# Patient Record
Sex: MALE | Race: WHITE
[De-identification: names, ages, dates, MRNs, and addresses within clinical notes are randomized per-mention and may not be internally consistent; named-entity substitution may affect disease eponyms.]

---

## 2018-02-26 ENCOUNTER — HOSPITAL ENCOUNTER (OUTPATIENT)
Dept: HOSPITAL 75 - LAB | Age: 83
End: 2018-02-26
Attending: INTERNAL MEDICINE
Payer: MEDICARE

## 2018-02-26 DIAGNOSIS — E78.4: ICD-10-CM

## 2018-02-26 DIAGNOSIS — I25.10: Primary | ICD-10-CM

## 2018-02-26 LAB
ALBUMIN SERPL-MCNC: 4.5 GM/DL (ref 3.2–4.5)
ALP SERPL-CCNC: 79 U/L (ref 40–136)
ALT SERPL-CCNC: 30 U/L (ref 0–55)
BILIRUB SERPL-MCNC: 0.9 MG/DL (ref 0.1–1)
BUN/CREAT SERPL: 26
CALCIUM SERPL-MCNC: 10.1 MG/DL (ref 8.5–10.1)
CHLORIDE SERPL-SCNC: 103 MMOL/L (ref 98–107)
CHOLEST SERPL-MCNC: 121 MG/DL (ref ?–200)
CO2 SERPL-SCNC: 27 MMOL/L (ref 21–32)
CREAT SERPL-MCNC: 1.19 MG/DL (ref 0.6–1.3)
GFR SERPLBLD BASED ON 1.73 SQ M-ARVRAT: 58 ML/MIN
GLUCOSE SERPL-MCNC: 102 MG/DL (ref 70–105)
HDLC SERPL-MCNC: 32 MG/DL (ref 40–60)
POTASSIUM SERPL-SCNC: 4.3 MMOL/L (ref 3.6–5)
PROT SERPL-MCNC: 8.2 GM/DL (ref 6.4–8.2)
SODIUM SERPL-SCNC: 141 MMOL/L (ref 135–145)
TRIGL SERPL-MCNC: 269 MG/DL (ref ?–150)
VLDLC SERPL CALC-MCNC: 54 MG/DL (ref 5–40)

## 2018-02-26 PROCEDURE — 36415 COLL VENOUS BLD VENIPUNCTURE: CPT

## 2018-02-26 PROCEDURE — 80061 LIPID PANEL: CPT

## 2018-02-26 PROCEDURE — 80053 COMPREHEN METABOLIC PANEL: CPT

## 2019-04-03 ENCOUNTER — HOSPITAL ENCOUNTER (OUTPATIENT)
Dept: HOSPITAL 75 - CARD | Age: 84
End: 2019-04-03
Attending: INTERNAL MEDICINE
Payer: MEDICARE

## 2019-04-03 DIAGNOSIS — I25.5: ICD-10-CM

## 2019-04-03 DIAGNOSIS — I49.3: Primary | ICD-10-CM

## 2019-04-03 DIAGNOSIS — E78.5: ICD-10-CM

## 2019-04-03 DIAGNOSIS — I25.10: ICD-10-CM

## 2019-04-03 DIAGNOSIS — I44.1: ICD-10-CM

## 2019-04-03 DIAGNOSIS — I11.0: ICD-10-CM

## 2019-04-03 DIAGNOSIS — I50.22: ICD-10-CM

## 2019-04-03 PROCEDURE — 93226 XTRNL ECG REC<48 HR SCAN A/R: CPT

## 2019-04-03 PROCEDURE — 93225 XTRNL ECG REC<48 HRS REC: CPT

## 2019-04-09 ENCOUNTER — HOSPITAL ENCOUNTER (OUTPATIENT)
Dept: HOSPITAL 75 - LAB | Age: 84
End: 2019-04-09
Attending: INTERNAL MEDICINE
Payer: MEDICARE

## 2019-04-09 DIAGNOSIS — I11.0: ICD-10-CM

## 2019-04-09 DIAGNOSIS — E78.5: ICD-10-CM

## 2019-04-09 DIAGNOSIS — I44.1: Primary | ICD-10-CM

## 2019-04-09 DIAGNOSIS — I25.5: ICD-10-CM

## 2019-04-09 DIAGNOSIS — I49.3: ICD-10-CM

## 2019-04-09 DIAGNOSIS — I50.22: ICD-10-CM

## 2019-04-09 DIAGNOSIS — I25.10: ICD-10-CM

## 2019-04-09 LAB
ALBUMIN SERPL-MCNC: 4.3 GM/DL (ref 3.2–4.5)
ALP SERPL-CCNC: 85 U/L (ref 40–136)
ALT SERPL-CCNC: 20 U/L (ref 0–55)
BASOPHILS # BLD AUTO: 0 10^3/UL (ref 0–0.1)
BASOPHILS NFR BLD AUTO: 1 % (ref 0–10)
BILIRUB SERPL-MCNC: 0.7 MG/DL (ref 0.1–1)
BUN/CREAT SERPL: 28
CALCIUM SERPL-MCNC: 9.8 MG/DL (ref 8.5–10.1)
CHLORIDE SERPL-SCNC: 108 MMOL/L (ref 98–107)
CHOLEST SERPL-MCNC: 93 MG/DL (ref ?–200)
CO2 SERPL-SCNC: 23 MMOL/L (ref 21–32)
CREAT SERPL-MCNC: 1.14 MG/DL (ref 0.6–1.3)
EOSINOPHIL # BLD AUTO: 0.2 10^3/UL (ref 0–0.3)
EOSINOPHIL NFR BLD AUTO: 4 % (ref 0–10)
ERYTHROCYTE [DISTWIDTH] IN BLOOD BY AUTOMATED COUNT: 13.4 % (ref 10–14.5)
GFR SERPLBLD BASED ON 1.73 SQ M-ARVRAT: > 60 ML/MIN
GLUCOSE SERPL-MCNC: 93 MG/DL (ref 70–105)
HCT VFR BLD CALC: 45 % (ref 40–54)
HDLC SERPL-MCNC: 30 MG/DL (ref 40–60)
HGB BLD-MCNC: 15.1 G/DL (ref 13.3–17.7)
LYMPHOCYTES # BLD AUTO: 1.2 X 10^3 (ref 1–4)
LYMPHOCYTES NFR BLD AUTO: 19 % (ref 12–44)
MAGNESIUM SERPL-MCNC: 2 MG/DL (ref 1.8–2.4)
MANUAL DIFFERENTIAL PERFORMED BLD QL: NO
MCH RBC QN AUTO: 32 PG (ref 25–34)
MCHC RBC AUTO-ENTMCNC: 33 G/DL (ref 32–36)
MCV RBC AUTO: 96 FL (ref 80–99)
MONOCYTES # BLD AUTO: 0.9 X 10^3 (ref 0–1)
MONOCYTES NFR BLD AUTO: 14 % (ref 0–12)
NEUTROPHILS # BLD AUTO: 4 X 10^3 (ref 1.8–7.8)
NEUTROPHILS NFR BLD AUTO: 64 % (ref 42–75)
PLATELET # BLD: 148 10^3/UL (ref 130–400)
PMV BLD AUTO: 12 FL (ref 7.4–10.4)
POTASSIUM SERPL-SCNC: 4.3 MMOL/L (ref 3.6–5)
PROT SERPL-MCNC: 7.4 GM/DL (ref 6.4–8.2)
SODIUM SERPL-SCNC: 140 MMOL/L (ref 135–145)
TRIGL SERPL-MCNC: 95 MG/DL (ref ?–150)
VLDLC SERPL CALC-MCNC: 19 MG/DL (ref 5–40)
WBC # BLD AUTO: 6.3 10^3/UL (ref 4.3–11)

## 2019-04-09 PROCEDURE — 80061 LIPID PANEL: CPT

## 2019-04-09 PROCEDURE — 36415 COLL VENOUS BLD VENIPUNCTURE: CPT

## 2019-04-09 PROCEDURE — 83735 ASSAY OF MAGNESIUM: CPT

## 2019-04-09 PROCEDURE — 85025 COMPLETE CBC W/AUTO DIFF WBC: CPT

## 2019-04-09 PROCEDURE — 80053 COMPREHEN METABOLIC PANEL: CPT

## 2019-04-09 PROCEDURE — 84443 ASSAY THYROID STIM HORMONE: CPT

## 2021-12-18 ENCOUNTER — HOSPITAL ENCOUNTER (INPATIENT)
Dept: HOSPITAL 75 - ER | Age: 86
LOS: 3 days | Discharge: HOME | DRG: 242 | End: 2021-12-21
Attending: INTERNAL MEDICINE | Admitting: FAMILY MEDICINE
Payer: MEDICARE

## 2021-12-18 VITALS — BODY MASS INDEX: 27.04 KG/M2 | WEIGHT: 186.73 LBS | HEIGHT: 69.69 IN

## 2021-12-18 VITALS — DIASTOLIC BLOOD PRESSURE: 83 MMHG | SYSTOLIC BLOOD PRESSURE: 144 MMHG

## 2021-12-18 DIAGNOSIS — N18.9: ICD-10-CM

## 2021-12-18 DIAGNOSIS — R35.0: ICD-10-CM

## 2021-12-18 DIAGNOSIS — I44.7: ICD-10-CM

## 2021-12-18 DIAGNOSIS — Z95.5: ICD-10-CM

## 2021-12-18 DIAGNOSIS — I44.2: Primary | ICD-10-CM

## 2021-12-18 DIAGNOSIS — Z66: ICD-10-CM

## 2021-12-18 DIAGNOSIS — E78.5: ICD-10-CM

## 2021-12-18 DIAGNOSIS — I25.10: ICD-10-CM

## 2021-12-18 DIAGNOSIS — I25.5: ICD-10-CM

## 2021-12-18 DIAGNOSIS — B37.49: ICD-10-CM

## 2021-12-18 DIAGNOSIS — K40.90: ICD-10-CM

## 2021-12-18 DIAGNOSIS — Z88.0: ICD-10-CM

## 2021-12-18 DIAGNOSIS — I50.33: ICD-10-CM

## 2021-12-18 DIAGNOSIS — I65.23: ICD-10-CM

## 2021-12-18 DIAGNOSIS — I13.0: ICD-10-CM

## 2021-12-18 DIAGNOSIS — Z87.891: ICD-10-CM

## 2021-12-18 DIAGNOSIS — I44.1: ICD-10-CM

## 2021-12-18 DIAGNOSIS — Z88.2: ICD-10-CM

## 2021-12-18 DIAGNOSIS — I49.3: ICD-10-CM

## 2021-12-18 DIAGNOSIS — H10.9: ICD-10-CM

## 2021-12-18 LAB
ALBUMIN SERPL-MCNC: 3.5 GM/DL (ref 3.2–4.5)
ALP SERPL-CCNC: 143 U/L (ref 40–136)
ALT SERPL-CCNC: 61 U/L (ref 0–55)
APTT BLD: 30 SEC (ref 24–35)
BASOPHILS # BLD AUTO: 0.1 10^3/UL (ref 0–0.1)
BASOPHILS NFR BLD AUTO: 1 % (ref 0–10)
BILIRUB SERPL-MCNC: 1.3 MG/DL (ref 0.1–1)
BUN/CREAT SERPL: 33
CALCIUM SERPL-MCNC: 9.1 MG/DL (ref 8.5–10.1)
CHLORIDE SERPL-SCNC: 109 MMOL/L (ref 98–107)
CO2 SERPL-SCNC: 18 MMOL/L (ref 21–32)
CREAT SERPL-MCNC: 1.12 MG/DL (ref 0.6–1.3)
EOSINOPHIL # BLD AUTO: 0.2 10^3/UL (ref 0–0.3)
EOSINOPHIL NFR BLD AUTO: 3 % (ref 0–10)
EOSINOPHIL NFR BLD MANUAL: 3 %
GFR SERPLBLD BASED ON 1.73 SQ M-ARVRAT: 62 ML/MIN
GLUCOSE SERPL-MCNC: 100 MG/DL (ref 70–105)
HCT VFR BLD CALC: 52 % (ref 40–54)
HGB BLD-MCNC: 17.2 G/DL (ref 13.3–17.7)
INR PPP: 1.1 (ref 0.8–1.4)
LYMPHOCYTES # BLD AUTO: 0.4 10^3/UL (ref 1–4)
LYMPHOCYTES NFR BLD AUTO: 4 % (ref 12–44)
MAGNESIUM SERPL-MCNC: 1.7 MG/DL (ref 1.6–2.4)
MANUAL DIFFERENTIAL PERFORMED BLD QL: YES
MCH RBC QN AUTO: 33 PG (ref 25–34)
MCHC RBC AUTO-ENTMCNC: 33 G/DL (ref 32–36)
MCV RBC AUTO: 100 FL (ref 80–99)
MONOCYTES # BLD AUTO: 0.9 10^3/UL (ref 0–1)
MONOCYTES NFR BLD AUTO: 11 % (ref 0–12)
MONOCYTES NFR BLD: 9 %
NEUTROPHILS # BLD AUTO: 6.9 10^3/UL (ref 1.8–7.8)
NEUTROPHILS NFR BLD AUTO: 81 % (ref 42–75)
NEUTS BAND NFR BLD MANUAL: 83 %
PLATELET # BLD: 118 10^3/UL (ref 130–400)
PMV BLD AUTO: 13.5 FL (ref 9–12.2)
POTASSIUM SERPL-SCNC: 4.8 MMOL/L (ref 3.6–5)
PROT SERPL-MCNC: 7 GM/DL (ref 6.4–8.2)
PROTHROMBIN TIME: 14.9 SEC (ref 12.2–14.7)
RBC MORPH BLD: NORMAL
SODIUM SERPL-SCNC: 137 MMOL/L (ref 135–145)
VARIANT LYMPHS NFR BLD MANUAL: 5 %
WBC # BLD AUTO: 8.5 10^3/UL (ref 4.3–11)

## 2021-12-18 PROCEDURE — 36415 COLL VENOUS BLD VENIPUNCTURE: CPT

## 2021-12-18 PROCEDURE — 85027 COMPLETE CBC AUTOMATED: CPT

## 2021-12-18 PROCEDURE — 80048 BASIC METABOLIC PNL TOTAL CA: CPT

## 2021-12-18 PROCEDURE — 71046 X-RAY EXAM CHEST 2 VIEWS: CPT

## 2021-12-18 PROCEDURE — 85025 COMPLETE CBC W/AUTO DIFF WBC: CPT

## 2021-12-18 PROCEDURE — 87070 CULTURE OTHR SPECIMN AEROBIC: CPT

## 2021-12-18 PROCEDURE — 93005 ELECTROCARDIOGRAM TRACING: CPT

## 2021-12-18 PROCEDURE — 51702 INSERT TEMP BLADDER CATH: CPT

## 2021-12-18 PROCEDURE — 71045 X-RAY EXAM CHEST 1 VIEW: CPT

## 2021-12-18 PROCEDURE — 80076 HEPATIC FUNCTION PANEL: CPT

## 2021-12-18 PROCEDURE — 85007 BL SMEAR W/DIFF WBC COUNT: CPT

## 2021-12-18 PROCEDURE — 80053 COMPREHEN METABOLIC PANEL: CPT

## 2021-12-18 PROCEDURE — 82947 ASSAY GLUCOSE BLOOD QUANT: CPT

## 2021-12-18 PROCEDURE — 85610 PROTHROMBIN TIME: CPT

## 2021-12-18 PROCEDURE — 83874 ASSAY OF MYOGLOBIN: CPT

## 2021-12-18 PROCEDURE — 33208 INSRT HEART PM ATRIAL & VENT: CPT

## 2021-12-18 PROCEDURE — 83735 ASSAY OF MAGNESIUM: CPT

## 2021-12-18 PROCEDURE — 80061 LIPID PANEL: CPT

## 2021-12-18 PROCEDURE — 85730 THROMBOPLASTIN TIME PARTIAL: CPT

## 2021-12-18 PROCEDURE — 84484 ASSAY OF TROPONIN QUANT: CPT

## 2021-12-18 PROCEDURE — 87081 CULTURE SCREEN ONLY: CPT

## 2021-12-18 PROCEDURE — 83880 ASSAY OF NATRIURETIC PEPTIDE: CPT

## 2021-12-18 PROCEDURE — 93041 RHYTHM ECG TRACING: CPT

## 2021-12-18 RX ADMIN — Medication SCH ML: at 21:28

## 2021-12-18 RX ADMIN — FUROSEMIDE SCH MG: 10 INJECTION, SOLUTION INTRAVENOUS at 21:27

## 2021-12-18 NOTE — ED CARDIAC GENERAL
History of Present Illness


General


Chief Complaint:  Cardiac/General Problems


Stated Complaint:  LEGS SWELLING


Source:  patient


Exam Limitations:  no limitations


 (ALEENA KAPLAN)





History of Present Illness


Date Seen by Provider:  Dec 18, 2021


Time Seen by Provider:  15:00


Initial Comments


To ER from [facility] with reports of bradycardia.  He presented there by 

private vehicle accompanied by his wife with reports of bilateral lower 

extremity swelling,, scrotal swelling, shortness of breath.  No chest pain.  

Follows with Dr. Diallo, history of coronary artery disease with 2 coronary 

stents.  He informs me that he does not want to be resuscitated or placed on a 

ventilator if his heart should stop.  He is okay with having a pacemaker placed.


Timing/Duration:  1-2 days


Severity:  moderate


Activities at Onset:  none


Prior CP/Workup:  no prior chest pain


NTG SL PTA:  No


ASA po PTA:  No


Associated Systoms:  No Chest Pain, No Cough; Shortness of Air (ALEENA KAPLAN)





Allergies and Home Medications


Allergies


Coded Allergies:  


     Penicillins (Unverified  Allergy, Mild, 09)


     Sulfa (Sulfonamide Antibiotics) (Verified  Allergy, Unknown, 09)





Patient Home Medication List


Home Medication List Reviewed:  Yes


 (ALEENA KAPLAN)


Lisinopril (Zestril) 20 Mg Tablet, (Reported)


   Entered as Reported by: EL ASHFORD on 09


Metoclopramide Hcl (Metoclopramide Hcl) 10 Mg Tablet, (Reported)


   Entered as Reported by: EL ASHFORD on 09


Metoprolol Succinate (Toprol Xl) 50 Mg Tab, (Reported)


   Entered as Reported by: EL ASHFORD on 09


[Htn Med]  , (Reported)


   Entered as Reported by: KEN GARCIA on 09





Review of Systems


Review of Systems


Constitutional:  see HPI


EENTM:  No Symptoms Reported


Respiratory:  See HPI, Orthopnea


Cardiovascular:  See HPI, Chest Pain


Gastrointestinal:  No Symptoms Reported


Genitourinary:  No Symptoms Reported


Musculoskeletal:  no symptoms reported


Skin:  no symptoms reported


Psychiatric/Neurological:  No Symptoms Reported


Endocrine:  No Symptoms Reported


Hematologic/Lymphatic:  No Symptoms Reported (ALEENA KAPLAN)





Past Medical-Social-Family Hx


Past Medical History


Reproductive Disorders:  No


 (ALEENA KAPLAN)





Physical Exam


Vital Signs


Capillary Refill :  


 (ALEENA KAPLAN)


Height, Weight, BMI


Height: '"


Weight: lbs. oz. kg;  BMI


Method:


General Appearance:  No Apparent Distress, WD/WN, Other (Alert mentating well 

blood pressure is 180/120.  Heart rate is 30 for complete heart block.  Oxygen 

99% respiratory rate 24.  Defibrillator patches attached to them upon arrival to

ER, Lasix Lovenox and aspirin given.  Dr. Diallo notified.)


Respiratory:  No Accessory Muscle Use, No Respiratory Distress


Cardiovascular:  Bradycardia, Irregularly Irregular


Gastrointestinal:  Normal Bowel Sounds, Non Tender, Soft


Extremity:  No Pedal Edema (3+ pitting edema up to the knees bilaterally.), 

Other (Cyanotic extremities and ears)


Neurologic/Psychiatric:  Alert


Skin:  Normal Color, Warm/Dry (ALEENA KAPLAN)


Extremity:  Pedal Edema (3+ bilateral lower extremity)


Neurologic/Psychiatric:  Oriented x3, Other (Hard of hearing) (MAIDA HAIR MD)





Progress/Results/Core Measures


Results/Orders


Lab Results





Laboratory Tests








Test


 21


14:54 Range/Units


 





 (MAIDA HAIR MD)


My Orders





Orders - MAIDA HAIR MD


Furosemide  Injection (Lasix  Injection) (21 14:57)


 (MAIDA HAIR MD)


Medications Given in ED





Current Medications








 Medications  Dose


 Ordered  Sig/Mickie


 Route  Start Time


 Stop Time Status Last Admin


Dose Admin


 


 Aspirin  324 mg  ONCE  ONCE


 PO  21 14:45


 21 14:46 DC 21 14:59


324 MG





 (MAIDA HAIR MD)





Progress


Progress Note :  


Progress Note


1506: I did see the patient with YULIA Huffman and was at bedside for 

evaluation.  Patient does show clear third-degree heart block on EKG and 

monitor.  Blood pressure is appropriate though and actually he is slightly 

hypertensive.  He does have marked edema to both legs.  Denies chest pain 

currently.  I did discuss the case with Dr vEans and reviewed EKG and current 

findings.  He is recommending  mg p.o., Lasix 40 mg IV, Lovenox 40 mg 

subcu and he will be on consult.  Recommending admission with pacemaker place

ment to follow likely tomorrow.  He is okay with cardiac stepdown bed.  Patient 

previously was under the service of Dr. Schmidt but now does not have primary 

physician and usually just sees Dr Evans.  He will be admitted to the 

hospitalist service.  Pending labs for review and we will ensure appropriate 

electrolyte balance.  Admit, inpatient status.  Patient and family updated by me

and agree with the plan.  We did discuss CODE STATUS with the patient and wife 

and he request DNR status.


 (MAIDA HAIR MD)


Initial ECG Impression Date:  Dec 18, 2021


Initial ECG Impression Time:  14:42


Initial ECG Rate:  35


Comment


Third-degree heart block with junctional escape rhythm and interventricular 

conduction delay.  Mild ST elevation in leads II, III and aVF it would appear.  

Left axis deviation noted.  I did discuss the case with Dr. Evans and reviewed 

EKG with him.  He does not believe this is ST elevation MI and represents more 

escape rhythm with interventricular conduction delay for which I agree.


 (MAIDA HAIR MD)





Departure


Communication (Admissions)


NAME:   LIZZIE CURRY


[facility] REC#:   [id number]


ACCOUNT#:   [id number]


PT STATUS:   REG ER


:   1935


PHYSICIAN:   ALEENA KAPLAN


ADMIT DATE:   21/ER


                                   ***Draft***


Date of Exam:21





CHEST 1 VIEW, AP/PA ONLY








Indication: Chest pain.





Comparison: None.





Discussion: Single portable upright view of the chest was


obtained. The fibular patches are present. Cardiomegaly is noted.


There is likely a small right pleural effusion. No consolidation.


No pneumothorax or osseous abnormality.





Impression:


1. Cardiomegaly with small right pleural effusion.





  Dictated on workstation # [id number]








Dict:   21 1522


Trans:   21 1526


[facility] [id number]





Interpreted by:     MIKHAIL JAMES MD


Electronically signed by:  


 (ALEENA KAPLAN)


Time/Spoke to Consulting Phy:  14:58


 (MAIDA HAIR MD)





Impression





   Primary Impression:  


   Complete heart block


   Additional Impression:  


   CHF (congestive heart failure)


Disposition:   ADMITTED AS INPATIENT


Condition:  Stable





Admissions


Decision to Admit Reason:  Admit from ER (General)


Decision to Admit/Date:  Dec 18, 2021


Time/Decision to Admit Time:  15:03


 (ALEENA KAPLAN)





Departure-Patient Inst.


Referrals:  


NO,LOCAL PHYSICIAN (PCP/Family)


Primary Care Physician











ALEENA KAPLAN             Dec 18, 2021 15:03


MAIDA HAIR MD          Dec 18, 2021 15:10

## 2021-12-18 NOTE — DIAGNOSTIC IMAGING REPORT
Indication: Chest pain.



Comparison: None.



Discussion: Single portable upright view of the chest was

obtained. The fibular patches are present. Cardiomegaly is noted.

There is likely a small right pleural effusion. No consolidation.

No pneumothorax or osseous abnormality.



Impression:

1. Cardiomegaly with small right pleural effusion.



Dictated by: 



  Dictated on workstation # [id number]

## 2021-12-19 LAB
ALBUMIN SERPL-MCNC: 3 GM/DL (ref 3.2–4.5)
ALP SERPL-CCNC: 119 U/L (ref 40–136)
ALT SERPL-CCNC: 50 U/L (ref 0–55)
BASOPHILS # BLD AUTO: 0.1 10^3/UL (ref 0–0.1)
BASOPHILS NFR BLD AUTO: 1 % (ref 0–10)
BILIRUB DIRECT SERPL-MCNC: 0.8 MG/DL (ref 0–0.3)
BILIRUB SERPL-MCNC: 1.7 MG/DL (ref 0.1–1)
BUN/CREAT SERPL: 30
CALCIUM SERPL-MCNC: 8.8 MG/DL (ref 8.5–10.1)
CHLORIDE SERPL-SCNC: 105 MMOL/L (ref 98–107)
CHOLEST SERPL-MCNC: 78 MG/DL (ref ?–200)
CO2 SERPL-SCNC: 21 MMOL/L (ref 21–32)
CREAT SERPL-MCNC: 1.16 MG/DL (ref 0.6–1.3)
EOSINOPHIL # BLD AUTO: 0.3 10^3/UL (ref 0–0.3)
EOSINOPHIL NFR BLD AUTO: 3 % (ref 0–10)
GFR SERPLBLD BASED ON 1.73 SQ M-ARVRAT: 60 ML/MIN
GLUCOSE SERPL-MCNC: 90 MG/DL (ref 70–105)
HCT VFR BLD CALC: 49 % (ref 40–54)
HDLC SERPL-MCNC: 35 MG/DL (ref 40–60)
HGB BLD-MCNC: 16.6 G/DL (ref 13.3–17.7)
LYMPHOCYTES # BLD AUTO: 0.4 10^3/UL (ref 1–4)
LYMPHOCYTES NFR BLD AUTO: 4 % (ref 12–44)
MANUAL DIFFERENTIAL PERFORMED BLD QL: NO
MCH RBC QN AUTO: 33 PG (ref 25–34)
MCHC RBC AUTO-ENTMCNC: 34 G/DL (ref 32–36)
MCV RBC AUTO: 97 FL (ref 80–99)
MONOCYTES # BLD AUTO: 1 10^3/UL (ref 0–1)
MONOCYTES NFR BLD AUTO: 11 % (ref 0–12)
NEUTROPHILS # BLD AUTO: 7.2 10^3/UL (ref 1.8–7.8)
NEUTROPHILS NFR BLD AUTO: 81 % (ref 42–75)
PLATELET # BLD: 99 10^3/UL (ref 130–400)
PMV BLD AUTO: 13 FL (ref 9–12.2)
POTASSIUM SERPL-SCNC: 4.2 MMOL/L (ref 3.6–5)
PROT SERPL-MCNC: 5.9 GM/DL (ref 6.4–8.2)
SODIUM SERPL-SCNC: 139 MMOL/L (ref 135–145)
TRIGL SERPL-MCNC: 69 MG/DL (ref ?–150)
VLDLC SERPL CALC-MCNC: 14 MG/DL (ref 5–40)
WBC # BLD AUTO: 8.9 10^3/UL (ref 4.3–11)

## 2021-12-19 PROCEDURE — 0JH606Z INSERTION OF PACEMAKER, DUAL CHAMBER INTO CHEST SUBCUTANEOUS TISSUE AND FASCIA, OPEN APPROACH: ICD-10-PCS | Performed by: INTERNAL MEDICINE

## 2021-12-19 PROCEDURE — 02HK3JZ INSERTION OF PACEMAKER LEAD INTO RIGHT VENTRICLE, PERCUTANEOUS APPROACH: ICD-10-PCS | Performed by: INTERNAL MEDICINE

## 2021-12-19 PROCEDURE — 02H63JZ INSERTION OF PACEMAKER LEAD INTO RIGHT ATRIUM, PERCUTANEOUS APPROACH: ICD-10-PCS | Performed by: INTERNAL MEDICINE

## 2021-12-19 RX ADMIN — Medication SCH ML: at 15:45

## 2021-12-19 RX ADMIN — Medication SCH ML: at 14:00

## 2021-12-19 RX ADMIN — FUROSEMIDE SCH MG: 10 INJECTION, SOLUTION INTRAVENOUS at 08:06

## 2021-12-19 RX ADMIN — CIPROFLOXACIN SCH MG: 500 TABLET, FILM COATED ORAL at 21:38

## 2021-12-19 RX ADMIN — Medication SCH ML: at 21:39

## 2021-12-19 RX ADMIN — NYSTATIN SCH GM: 100000 OINTMENT TOPICAL at 21:38

## 2021-12-19 NOTE — HISTORY & PHYSICAL
HPI


History of Present Illness:


Having shortness of breath for about a month. His granddaughter works at a 

nursing home and he had her bring him a COVID19 test which was negative. The 

next morning his grandson and daughter took him to the clinic and they were told

to bring him to the hospital right away. He states he feels about the same 

today.





He has not had COVID vaccination, he is interested in it, but wants to talk to 

his wife first.


Source:  patient


Exam Limitations:  clinical condition


Date seen by provider:  Dec 19, 2021


Time Seen by Provider:  09:35


Attending Physician


Sarita Garcia MD


PCP


No,Local Physician


Consult





Date of Admission


Dec 18, 2021 at 15:06





Home Medications


Home Medications


Reviewed patient Home Medication Reconciliation performed by pharmacy medication

reconciliations technician and/or nursing.


Patients Allergies have been reviewed.





Allergies


Coded Allergies:  


     Penicillins (Unverified  Allergy, Mild, 4/12/09)


     Sulfa (Sulfonamide Antibiotics) (Verified  Allergy, Unknown, 4/12/09)





PMH-Social-Family Hx


Patient Social History


Smoking Status:  Former Smoker (quit around 1990)


Alcohol Use?:  No


Have you traveled recently?:  No





Immunizations Up To Date


Influenza Vaccine Up-to-Date:  No; Not Current


First/Initial COVID19 Vaccinat:  None


Second COVID19 Vaccination Erasmo:  None


Third COVID19 Vaccination Date:  None





Past Medical History





PMHx:


Coronary artery disease


Hypertension





SurgHx:


Coronary artery stenting





Review of Systems ([facility])


Constitutional:  No fever


EENTM:  other (runny nose all the time); No throat pain


Respiratory:  cough (off and on for some time), short of breath


Cardiovascular:  No chest pain


Gastrointestinal:  abdominal pain (occasional bellyache); No constipation; 

diarrhea (occasional); No nausea, No vomiting


Genitourinary:  No dysuria


Musculoskeletal:  joint pain (right hand hurting this morning and about a week 

ago); No muscle pain


Skin:  rash (around testicles, non-itching)





Reviewed Test Results


Reviewed Test Results


Lab





Laboratory Tests








Test


 12/18/21


14:54 12/19/21


05:58 Range/Units


 


 


White Blood Count


 8.5 


 8.9 


 4.3-11.0


10^3/uL


 


Red Blood Count


 5.19 


 5.04 


 4.30-5.52


10^6/uL


 


Hemoglobin 17.2  16.6  13.3-17.7  g/dL


 


Hematocrit 52  49  40-54  %


 


Mean Corpuscular Volume 100 H 97  80-99  fL


 


Mean Corpuscular Hemoglobin 33  33  25-34  pg


 


Mean Corpuscular Hemoglobin


Concent 33 


 34 


 32-36  g/dL





 


Red Cell Distribution Width 14.6 H 14.4  10.0-14.5  %


 


Platelet Count


 118 L


 99 L


 130-400


10^3/uL


 


Mean Platelet Volume 13.5 H 13.0 H 9.0-12.2  fL


 


Immature Granulocyte % (Auto) 0  0   %


 


Neutrophils (%) (Auto) 81 H 81 H 42-75  %


 


Lymphocytes (%) (Auto) 4 L 4 L 12-44  %


 


Monocytes (%) (Auto) 11  11  0-12  %


 


Eosinophils (%) (Auto) 3  3  0-10  %


 


Basophils (%) (Auto) 1  1  0-10  %


 


Neutrophils # (Auto)


 6.9 


 7.2 


 1.8-7.8


10^3/uL


 


Lymphocytes # (Auto)


 0.4 L


 0.4 L


 1.0-4.0


10^3/uL


 


Monocytes # (Auto)


 0.9 


 1.0 


 0.0-1.0


10^3/uL


 


Eosinophils # (Auto)


 0.2 


 0.3 


 0.0-0.3


10^3/uL


 


Basophils # (Auto)


 0.1 


 0.1 


 0.0-0.1


10^3/uL


 


Immature Granulocyte # (Auto)


 0.0 


 0.0 


 0.0-0.1


10^3/uL


 


Neutrophils % (Manual) 83    %


 


Lymphocytes % (Manual) 5    %


 


Monocytes % (Manual) 9    %


 


Eosinophils % (Manual) 3    %


 


Percent Immature Platelet


Fraction 8.7 H


 8.1 H


 0.0-7.6  %





 


Blood Morphology Comment NORMAL    


 


Prothrombin Time 14.9 H  12.2-14.7  SEC


 


INR Comment 1.1   0.8-1.4  


 


Activated Partial


Thromboplast Time 30 


 


 24-35  SEC





 


Sodium Level 137  139  135-145  MMOL/L


 


Potassium Level 4.8  4.2  3.6-5.0  MMOL/L


 


Chloride Level 109 H 105    MMOL/L


 


Carbon Dioxide Level 18 L 21  21-32  MMOL/L


 


Anion Gap 10  13  5-14  MMOL/L


 


Blood Urea Nitrogen 37 H 35 H 7-18  MG/DL


 


Creatinine


 1.12 


 1.16 


 0.60-1.30


MG/DL


 


Estimat Glomerular Filtration


Rate 62 


 60 


  





 


BUN/Creatinine Ratio 33  30   


 


Glucose Level 100  90    MG/DL


 


Calcium Level 9.1  8.8  8.5-10.1  MG/DL


 


Corrected Calcium 9.5   8.5-10.1  MG/DL


 


Magnesium Level 1.7   1.6-2.4  MG/DL


 


Total Bilirubin 1.3 H 1.7 H 0.1-1.0  MG/DL


 


Aspartate Amino Transf


(AST/SGOT) 49 H


 45 H


 5-34  U/L





 


Alanine Aminotransferase


(ALT/SGPT) 61 H


 50 


 0-55  U/L





 


Alkaline Phosphatase 143 H 119    U/L


 


Myoglobin


 155.7 H


 


 10.0-92.0


NG/ML


 


Troponin I 0.091 H  <0.028  NG/ML


 


B-Type Natriuretic Peptide 3652.2 H  <100.0  PG/ML


 


Total Protein 7.0  5.9 L 6.4-8.2  GM/DL


 


Albumin 3.5  3.0 L 3.2-4.5  GM/DL


 


Direct Bilirubin  0.8 H 0.0-0.3  MG/DL


 


Indirect Bilirubin  0.9   MG/DL


 


Triglycerides Level  69  <150  MG/DL


 


Cholesterol Level  78  < 200  MG/DL


 


LDL Cholesterol Direct  28  1-129  MG/DL


 


VLDL Cholesterol  14  5-40  MG/DL


 


HDL Cholesterol  35 L 40-60  MG/DL








Radiology


CXR 12/18/21: 


Impression: 1. Cardiomegaly with small right pleural effusion.





Physical Exam-([facility])


Physical Exam


Vital Signs





                          VS - Last 72 Hours, by Label








 12/18/21 12/18/21 12/18/21 12/18/21





 14:31 16:40 16:49 16:51


 


Temp   36.6 


 


Pulse 35 34 35 


 


Resp 25 22 20 


 


B/P (MAP) 160/102 (121) 144/83 139/73 


 


Pulse Ox 94 95 99 


 


O2 Delivery Room Air Room Air Room Air Room Air


 


    





 12/18/21 12/18/21 12/18/21 12/18/21





 16:52 19:00 19:41 21:00


 


Temp   36.8 


 


Pulse 33 40 35 


 


Resp   21 


 


B/P (MAP)   144/79 


 


Pulse Ox   96 


 


O2 Delivery   Room Air Room Air


 


    





 12/19/21 12/19/21 12/19/21 12/19/21





 00:00 01:00 04:00 07:00


 


Temp 37.0  36.5 


 


Pulse 36 36 35 39


 


Resp 14  22 


 


B/P (MAP) 159/62  114/66 


 


Pulse Ox 94  96 


 


O2 Delivery Room Air  Room Air 


 


    





 12/19/21   





 08:10   


 


Pulse 36   


 


Resp 14   


 


B/P (MAP) 180/91   


 


Pulse Ox 96   


 


O2 Delivery Room Air   





Capillary Refill : Less Than 3 Seconds


General Appearance:  no apparent distress


Respiratory:  rales


Cardiovascular:  no murmur, bradycardia


Gastrointestinal:  normal bowel sounds, non tender, soft


Extremities:  pedal edema (2+ pitting edema to above knees)


Neurologic/Psychiatric:  alert, normal mood/affect, oriented x 3


Skin:  other (erythema with extension to thighs over scrotum)





Assessment/Plan


Assessment/Plan


Admission Status:  Inpatient Order (span 2 midnights)


Reason for Inpatient Admission:  


Sevre bradycardia with CHF exacerbation





(1) Complete heart block


Status:  Acute


Assessment & Plan:  Hemodynamically stable so far, plan for pacemaker per 

Cardiology later today.





(2) Coronary artery disease


Status:  Chronic


Qualifiers:  


   Qualified Codes:  I25.10 - Atherosclerotic heart disease of native coronary 

artery without angina pectoris


(3) CHF (congestive heart failure)


Status:  Acute


Assessment & Plan:  Cardiology consulted, appreciate recommendations. Currently 

on lasix 40 mg IV BID.





(4) Intertrigo of genitocrural region due to Candida species


Status:  Acute


Assessment & Plan:  Nystatin ointment





(5) COVID-19 vaccination not done


Status:  Acute


Assessment & Plan:  Discussed availability, he will talk with wife.





(6) DVT prophylaxis


Status:  Acute


Assessment & Plan:  Enoxaparin when okay with Cardiology








Clinical Quality Measures


AMI/AHF:


ASA po Prior to arrival:  SARITA Toussaint MD             Dec 19, 2021 09:32

## 2021-12-19 NOTE — DIAGNOSTIC IMAGING REPORT
INDICATION: Cardiac device placement.



TIME OF EXAM: 12:49 PM



Correlation is made prior chest 12/18/2021.



Heart is enlarged. Cardiac pacer has been placed has lead tips in

region of right atrium and right ventricle. Lungs appear clear.

No infiltrate, effusion or pneumothorax is seen.



IMPRESSION: Pacemaker placement. No pneumothorax is identified.



Dictated by: 



  Dictated on workstation # [id number]

## 2021-12-19 NOTE — OPERATIVE REPORT
DATE OF SERVICE:  12/19/2021



PREOPERATIVE DIAGNOSIS:

Complete heart block.



POSTOPERATIVE DIAGNOSIS:

Complete heart block.



PROCEDURE:

Dual chamber pacemaker implantation.



ESTIMATED BLOOD LOSS:

Less than 20 mL.



INDICATIONS:

The patient is an 86-year-old gentleman who has complete heart block and who has

been symptomatic from it.  New dual chamber pacemaker implantation was carried

out today after having obtained an informed consent.



DESCRIPTION OF PROCEDURE:

He was brought to the cardiac catheterization laboratory in a fasting state. 

The left prepectoral area was prepared and draped in the usual sterile fashion. 

Lidocaine 1% was used for local anesthesia.  Modified Seldinger technique was

used to advance 2 guidewires into the left subclavian vein and the tip of the

guidewires were placed in the right atrium.  Subsequently, we used sharp and

blunt dissection to make a pacemaker pocket.  Good hemostasis was assured.  The

guidewires were used to advance sheaths and the wires were removed.  The sheaths

were used to advance leads and the sheaths were removed.  The leads were

positioned under fluoroscopy.  The ventricular lead was placed at the right

ventricular apex and actively fixed.  The atrial lead was placed at the right

atrial appendage and actively fixed.  R waves were measured at 6.5 millivolts. 

Pacing impedance in the ventricle was 751 ohms.  Capture threshold was 0.8 volts

at 0.4 milliseconds.  P-wave amplitude was 1.9 millivolts.  Atrial lead

impedance was 534 ohms.  Atrial capture threshold was 1.4 volts at 0.4

milliseconds.  The leads were sutured to the prepectoral fascia using 0

Ethibond.  The pacemaker pocket had been packed with saline gauze.  This was

removed.  The pocket was thoroughly irrigated with an antibiotic solution.  Good

hemostasis was assured.  The leads were attached to a dual chamber pacemaker and

the pacemaker and leads were placed in the pocket and the pocket was closed in 2

layers using 3.0 Vicryl.  The right atrial lead is [employer], serial #[id number]. 

The ventricular lead is [employer] with serial #[id number].  The leads were

attached to a dual chamber pacemaker.  This is a [employer] with serial #[id number]. 

The device a DDDR mode with a lower rate of 60 beats per minute and upper rate

of 130 beats per minute.





Job ID: [id number]

DocumentID: [id number]

Dictated Date:  12/19/2021 12:29:43

Transcription Date: 12/19/2021 16:31:02

Dictated By: ROBERTH NIEVES MD, MA, FACP, FACC,

## 2021-12-19 NOTE — CONSULTATION-CARDIOLOGY
HPI-Cardiology


Cardiology Consultation:


Date of Consultation


12/19/21


Time Seen by a Provider:  09:30


Date of Admission





Attending Physician


Gladys Garcia MD


Admitting Physician


No,Local Physician


Consulting Physician


ROBERTH NIEVES MD, MA, FACP, FACC, FSCAI, CCDS





HPI:


Chief Complaint:


Shortness of breath





85 yo man with increasing shortness of breath for several weeks. Has been 

noticing increasing leg swelling and also some scrotal swelling. Denies cp or 

palp or syncope. Notes gen malaise and weakness. Denies fever or chills





Review of Systems-Cardiology


Review of Systems


Constitutional:  As described under HPI


Eyes:  No vision change


Ears/Nose/Throat:  chronic hearing loss; No ear pain, No nasal drainage, No 

recent hearing loss


Respiratory:  As described under HPI


Cardiovascular:  As described under HPI


Gastrointestinal:  No diarrhea, No nausea, No vomiting


Genitourinary:  No hematuria; other (chronic frequency of urination); No urine 

frequency changes


Musculoskeletal:  back pain (chronic)


Skin:  No rash, No ulcerations


Psychiatric/Neurological:  No seizure, No focal weakness, No syncope


Hematologic:  No bleeding abnormalities





PMH-Social-Family Hx


Patient Social History


Smoking Status:  Former Smoker (quit around 1990)


Have you traveled recently?:  No


Alcohol Use?:  No


Pt feels they are or have been:  No





Past Medical History


PMH


As described under Assessment.





Family Medical History


Family Medical History:  


Does not report fam h/o early CAD





Allergies and Home Medications


Allergies


Coded Allergies:  


     Penicillins (Unverified  Allergy, Mild, 4/12/09)


     Sulfa (Sulfonamide Antibiotics) (Verified  Allergy, Unknown, 4/12/09)





Patient Home Medication List


Home Medication List Reviewed:  Yes


Aspirin (Aspirin) 81 Mg Tab.chew, 81 MG PO DAILY, (Reported)


   Entered as Reported by: RYNE BIGGS RN on 12/18/21 1805


   Last Action: New Order


Famotidine (Acid Reducer (FAMOTIDINE)) 20 Mg Tablet, 20 MG PO DAILY, (Reported)


   Entered as Reported by: RYNE BIGGS RN on 12/18/21 1805


   Last Action: New Order


Losartan Potassium (Losartan Potassium) 100 Mg Tablet, 100 MG PO DAILY, (Reporte

d)


   Entered as Reported by: RYNE BIGGS RN on 12/18/21 1805


   Last Action: New Order


Lovastatin (Lovastatin) 20 Mg Tablet, 20 MG PO DAILY, (Reported)


   Entered as Reported by: RYNE BIGGS RN on 12/18/21 1805


   Last Action: Converted


Metoprolol Succinate (Metoprolol Succinate) 100 Mg Tab.er.24h, 100 MG PO DAILY, 

(Reported)


   Entered as Reported by: RYNE BIGGS RN on 12/18/21 1805


   Last Action: New Order


Discontinued Medications


Lisinopril (Zestril) 20 Mg Tablet, (Reported)


   Discontinued Reason: No Longer Taking


   Entered as Reported by: EL ASHFORD on 4/12/09 0705


   Last Action: Discontinued


Metoclopramide Hcl (Metoclopramide Hcl) 10 Mg Tablet, (Reported)


   Discontinued Reason: No Longer Taking


   Entered as Reported by: EL ASHFORD on 4/12/09 0705


   Last Action: Discontinued


Metoprolol Succinate (Toprol Xl) 50 Mg Tab, (Reported)


   Discontinued Reason: No Longer Taking


   Entered as Reported by: EL ASHFORD on 4/12/09 0705


   Last Action: Discontinued


[Htn Med]  , (Reported)


   Discontinued Reason: No Longer Taking


   Entered as Reported by: KEN GARCIA on 4/12/09 0652


   Last Action: Discontinued





Physical Exam-Cardiology


Physical Exam


Vital Signs/I&O











 12/19/21 12/19/21 12/19/21 12/19/21





 00:00 01:00 04:00 07:00


 


Temp 37.0  36.5 


 


Pulse 36 36 35 39


 


Resp 14  22 


 


B/P (MAP) 159/62  114/66 


 


Pulse Ox 94  96 


 


O2 Delivery Room Air  Room Air 


 


    





 12/19/21   





 08:10   


 


Pulse 36   


 


Resp 14   


 


B/P (MAP) 180/91   


 


Pulse Ox 96   


 


O2 Delivery Room Air   














 12/18/21





 23:59


 


Intake Total 100 ml


 


Output Total 1250 ml


 


Balance -1150 ml





Capillary Refill : Less Than 3 Seconds


Constitutional:  AAO x 3, well-developed, well-nourished


HEENT:  EOMI, hard of hearing; No xanthelasmas are seen


Neck:  carotid pulses are 2 + bilaterally, with good upstrokes


Respiratory:  No accessory muscle use; other (good air entry on both sides, 

diminished at the bases)


Cardiovascular:  regular rate-rhythm, S1 and S2, systolic murmur (soft NITHYA at 

card base)


Gastrointestinal:  No tender; soft; No guarding, No rebound; audible bowel 

sounds


Extremities:  swelling (2+ leg edema; mild to mod scrotal swelling on both 

sides); No clubbing, No cyanosis


Neurologic/Psychiatric:  oriented x 3, other (moves all limbs equally)


Skin:  No rash, No ulcerations





Data Review


Labs


Laboratory Tests


12/18/21 14:54: 


White Blood Count 8.5, Red Blood Count 5.19, Hemoglobin 17.2, Hematocrit 52, 

Mean Corpuscular Volume 100H, Mean Corpuscular Hemoglobin 33, Mean Corpuscular 

Hemoglobin Concent 33, Red Cell Distribution Width 14.6H, Platelet Count 118L, 

Mean Platelet Volume 13.5H, Immature Granulocyte % (Auto) 0, Neutrophils (%) 

(Auto) 81H, Lymphocytes (%) (Auto) 4L, Monocytes (%) (Auto) 11, Eosinophils (%) 

(Auto) 3, Basophils (%) (Auto) 1, Neutrophils # (Auto) 6.9, Lymphocytes # (Auto)

0.4L, Monocytes # (Auto) 0.9, Eosinophils # (Auto) 0.2, Basophils # (Auto) 0.1, 

Immature Granulocyte # (Auto) 0.0, Neutrophils % (Manual) 83, Lymphocytes % 

(Manual) 5, Monocytes % (Manual) 9, Eosinophils % (Manual) 3, Percent Immature 

Platelet Fraction 8.7H, Blood Morphology Comment NORMAL, Prothrombin Time 14.9H,

INR Comment 1.1, Activated Partial Thromboplast Time 30, Sodium Level 137, 

Potassium Level 4.8, Chloride Level 109H, Carbon Dioxide Level 18L, Anion Gap 

10, Blood Urea Nitrogen 37H, Creatinine 1.12, Estimat Glomerular Filtration Rate

62, BUN/Creatinine Ratio 33, Glucose Level 100, Calcium Level 9.1, Corrected 

Calcium 9.5, Magnesium Level 1.7, Total Bilirubin 1.3H, Aspartate Amino Transf 

(AST/SGOT) 49H, Alanine Aminotransferase (ALT/SGPT) 61H, Alkaline Phosphatase 

143H, Myoglobin 155.7H, Troponin I 0.091H, B-Type Natriuretic Peptide 3652.2H, 

Total Protein 7.0, Albumin 3.5


12/19/21 05:58: 


White Blood Count 8.9, Red Blood Count 5.04, Hemoglobin 16.6, Hematocrit 49, 

Mean Corpuscular Volume 97, Mean Corpuscular Hemoglobin 33, Mean Corpuscular He

moglobin Concent 34, Red Cell Distribution Width 14.4, Platelet Count 99L, Mean 

Platelet Volume 13.0H, Immature Granulocyte % (Auto) 0, Neutrophils (%) (Auto) 

81H, Lymphocytes (%) (Auto) 4L, Monocytes (%) (Auto) 11, Eosinophils (%) (Auto) 

3, Basophils (%) (Auto) 1, Neutrophils # (Auto) 7.2, Lymphocytes # (Auto) 0.4L, 

Monocytes # (Auto) 1.0, Eosinophils # (Auto) 0.3, Basophils # (Auto) 0.1, Im

mature Granulocyte # (Auto) 0.0, Percent Immature Platelet Fraction 8.1H, Sodium

Level 139, Potassium Level 4.2, Chloride Level 105, Carbon Dioxide Level 21, 

Anion Gap 13, Blood Urea Nitrogen 35H, Creatinine 1.16, Estimat Glomerular 

Filtration Rate 60, BUN/Creatinine Ratio 30, Glucose Level 90, Calcium Level 

8.8, Total Bilirubin 1.7H, Aspartate Amino Transf (AST/SGOT) 45H, Alanine 

Aminotransferase (ALT/SGPT) 50, Alkaline Phosphatase 119, Total Protein 5.9L, 

Albumin 3.0L, Direct Bilirubin 0.8H, Indirect Bilirubin 0.9, Triglycerides Level

69, Cholesterol Level 78, LDL Cholesterol Direct 28, VLDL Cholesterol 14, HDL 

Cholesterol 35L





Laboratory Tests


12/18/21 14:54








12/19/21 05:58











A/P-Cardiology


Assessment/Admission Diagnosis





Complete heart block





Coronary artery disease


- Cardiac cath April 2009: with a history of drug-eluting stenting of the mid 

left anterior descending


- Cardiac cath May 2009: OSIRIS of the left circumflex obtuse marginal .


- Refuses MPI.








ICM:


- Echocardiogram from December 2013 showed LVEF 45-50%.


- Most recent echo of May 2019 showed LVEF 50-55%. Grade 1 diastolic 

dysfunction. Mild to MR. Mild AoR. Small amt of pericardial effusion that does 

not appear to be of hemodynamic signif. RVSP approx 27 mmHg








Mild chronic renal insufficiency


- stable. Managed by PCP








Carotid dz


- Carotid u/s of May 2019 showed 60-79% R ICA stenosis. Approx 50% L ICA 

stenosis.








Medication Intolerance:


- Intolerance to ACE inhibitors on account of cough.





Hypertension


- controlled





Hyperlipidemia


- being treated with lovastatin. Managed by PCP





Abnormal ECG


- ECG of 4/1/19 shows NSR with Wenckeback AV block, LBBB, and isolated PVCs





Chronic right inguinal hernia


- for which he has opted not to have surgery





Frequent urination,


- chronic, for which he follows with his PCP








Chronic hardness of hearing





Discussion and Recomendations





* Dual chamber pacemaker recommended. I discussed with him in detail the 

  rationale, procedure, risks, benefits, potential complications and 

  alternatives of the procedure. He understands and provides informed consent


* Diuretics as needed and as tolerated


* Monitor labs





Clinical Quality Measures


AMI/AHF:


ASA po Prior to arrival:  ROBERTH Bonilla MD FACP FAC CCDS   Dec 19, 2021 11:09

## 2021-12-20 LAB
ALBUMIN SERPL-MCNC: 2.8 GM/DL (ref 3.2–4.5)
ALP SERPL-CCNC: 108 U/L (ref 40–136)
ALT SERPL-CCNC: 39 U/L (ref 0–55)
BILIRUB SERPL-MCNC: 1.9 MG/DL (ref 0.1–1)
BUN/CREAT SERPL: 28
CALCIUM SERPL-MCNC: 8.4 MG/DL (ref 8.5–10.1)
CHLORIDE SERPL-SCNC: 102 MMOL/L (ref 98–107)
CO2 SERPL-SCNC: 22 MMOL/L (ref 21–32)
CREAT SERPL-MCNC: 1.1 MG/DL (ref 0.6–1.3)
GFR SERPLBLD BASED ON 1.73 SQ M-ARVRAT: 63 ML/MIN
GLUCOSE SERPL-MCNC: 89 MG/DL (ref 70–105)
HCT VFR BLD CALC: 45 % (ref 40–54)
HGB BLD-MCNC: 15.4 G/DL (ref 13.3–17.7)
MCH RBC QN AUTO: 33 PG (ref 25–34)
MCHC RBC AUTO-ENTMCNC: 34 G/DL (ref 32–36)
MCV RBC AUTO: 97 FL (ref 80–99)
PLATELET # BLD: 95 10^3/UL (ref 130–400)
PMV BLD AUTO: 13.1 FL (ref 9–12.2)
POTASSIUM SERPL-SCNC: 4 MMOL/L (ref 3.6–5)
PROT SERPL-MCNC: 5.7 GM/DL (ref 6.4–8.2)
SODIUM SERPL-SCNC: 136 MMOL/L (ref 135–145)
WBC # BLD AUTO: 8.5 10^3/UL (ref 4.3–11)

## 2021-12-20 RX ADMIN — CIPROFLOXACIN SCH MG: 500 TABLET, FILM COATED ORAL at 20:19

## 2021-12-20 RX ADMIN — CIPROFLOXACIN SCH MG: 500 TABLET, FILM COATED ORAL at 08:51

## 2021-12-20 RX ADMIN — Medication SCH ML: at 11:18

## 2021-12-20 RX ADMIN — Medication SCH ML: at 15:12

## 2021-12-20 RX ADMIN — NYSTATIN SCH GM: 100000 OINTMENT TOPICAL at 08:52

## 2021-12-20 RX ADMIN — NYSTATIN SCH GM: 100000 OINTMENT TOPICAL at 20:19

## 2021-12-20 RX ADMIN — Medication SCH ML: at 20:21

## 2021-12-20 RX ADMIN — VANCOMYCIN HYDROCHLORIDE SCH MLS/HR: 500 INJECTION, POWDER, LYOPHILIZED, FOR SOLUTION INTRAVENOUS at 11:18

## 2021-12-20 RX ADMIN — SIMVASTATIN SCH MG: 10 TABLET, FILM COATED ORAL at 08:52

## 2021-12-20 RX ADMIN — FUROSEMIDE SCH MG: 20 TABLET ORAL at 08:52

## 2021-12-20 RX ADMIN — ASPIRIN 81 MG CHEWABLE TABLET SCH MG: 81 TABLET CHEWABLE at 08:51

## 2021-12-20 RX ADMIN — AMLODIPINE BESYLATE SCH MG: 5 TABLET ORAL at 08:51

## 2021-12-20 NOTE — PHYSICAL THERAPY EVALUATION
PT Evaluation-General


Medical Diagnosis


Admission Date


Dec 18, 2021 at 15:06


Medical Diagnosis:  Shortness of breath with pacemaker placed


Onset Date:  Dec 19, 2021





Therapy Diagnosis


Therapy Diagnosis:  Gait deficit, strength deficit





Precautions


Precautions/Isolations:  Fall Prevention, Standard Precautions





Referral


Physician:  Dr. Mcdowell


Reason for Referral:  Evaluation/Treatment





Social History


Home:  Single Level


Current Living Status:  Children


Entry Into Home:  Stairs With Railing


PT Steps Into Home:  4





Prior


Prior Level of Function


SCALE: Activities may be completed with or without assistive devices.





6-Indepedent-patient completes the activity by him/herself with no assistance 

from a helper.


5-Set-up or Clean-up Assistance-helper sets up or cleans up; patient completes 

activity. [place] assists only prior to or  


    following the activity.


4-Supervision or Touching Assistance-helper provides verbal cues and/or [place]

hector/steadying and/or contact guard assistance as patient completes activity. 

Assistance may be provided   


    throughout the activity or intermittently.


3-Partial/Moderate Assistance-helper does LESS THAN HALF the effort. [place] 

lifts, holds or supports trunk or limbs, but provides less than half the effort.


2-Substantial/Maximal Assistance-helper does MORE THAN HALF the effort. [place] 

lifts or holds trunk or limbs and provides more than half the effort.


4-Gwzgymupt-wmmpwc does ALL the effort. Patient does none of the effort to 

complete the activity. Or, the assistance of 2 or more helpers is required for 

the patient to complete the  


    activity.


If activity was not attempted, code reason:


7-Patient Refused.


9-Not Applicable-not attempted and the patient did not perform the activity 

before the current illness, exacerbation or injury.


10-Not Attempted due to Environmental Limitations-(lack of equipment, weather 

restraints, etc.).


88-Not Attempted due to Medical Conditions or Safety Concerns.


Bed Mobility:  6


Transfers (B,C,W/C):  6


Gait:  6


Stairs:  6


Indoor Mobility (Ambulation):  Independent


Stairs:  Independent


Prior Devices Use:  None





PT Evaluation-Current


Subjective


Patient using BR upon PT arrival, agreeable to treatment.  Patient reports no 

pain at this time, however has moved his left UE sling around his neck/traps and

is using his left UE as usual.  Patient was educated on the Pacemaker 

precautions and to keep his Left UE in a sling without movement.





Objective


Patient Orientation:  Person, Place, Time, Situation





ROM/Strength


ROM Lower Extremities


WFLs bilaterally all available planes.


Strength Lower Extremities


3+/5 Bilaterally all hip, knee and ankle motions.





Sensory


Vision:  Wears Glasses


Hearing:  Functional


Sensation Right Lower Extremit:  Intact


Sensation Left Lower Extremity:  Intact





Transfers


Roll Left to Right (QC):  4


Sit to Lying (QC):  4


Lying to Sitting/Side of Bed(Q:  4


Sit to Stand (QC):  4


Chair/Bed-to-Chair Xfer(QC):  4


Toilet Transfer (QC):  4





Gait


Does the Patient Walk?:  Yes


Mode of Locomotion:  Walk


Anticipated Mode of Locomotion:  Walk


Walk 10 feet (QC):  5


Walk 50 ft with 2 Turns(QC):  5


Walk 150 ft (QC):  5


Distance:  150


Gait Assistive Device:  None





Balance


Sitting Static:  Normal


Sitting Dynamic:  Normal


Standing Static:  Good


 Standing Dynamic:  Good





Assessment/Needs


Patient tolerated treatment well.  Demonstrates moderate LE strength deficit 

bilaterally and decline in balance at times while on his feet, however no ori 

loss of balance.  Patient ambulates 150 feet with no AD, with CGA and verbal 

cues for safety, progression, balance and conservation of energy.  Patient in 

bed post treatment with all needs met, nursing notified, call light in hand.


Rehab Potential:  Good


Equipment Needs


Unsure at this time.





PT Long Term Goals


Long Term Goals


PT Long Term Goals Time Frame:  Dec 31, 2021


Roll Left & Right (QC):  6


Sit to Lying (QC):  6


Lying-Sitting on Side/Bed(QC):  6


Sit to Stand (QC):  6


Chair/Bed-to-Chair Xfer(QC):  6


Toilet Transfer (QC):  6


Does the Patient Walk:  Yes


Walk 10 feet (QC):  6


Walk 50ft with 2 Turns (QC):  6


Walk 150 ft (QC):  6


1 Step (curb) (QC):  6


4 Steps (QC):  6





PT Plan


Problem List


Problem List:  Activity Tolerance, Functional Strength, Safety, Balance, Gait, 

Transfer, Bed Mobility, ROM, Other





Treatment/Plan


Treatment Plan:  Continue Plan of Care


Treatment Plan:  Bed Mobility, Education, Functional Activity Leslie, Functional 

Strength, Group Therapy, Gait, Safety, Therapeutic Exercise, Transfers


Treatment Duration:  Dec 31, 2021


Frequency:  6 times per week


Estimated Hrs Per Day:  .25 hour per day





Safety Risks/Education


Patient Education:  Gait Training


Teaching Recipient:  Patient, Health Care Proxy


Teaching Methods:  Demonstration, Discussion


Response to Teaching:  Verbalize Understanding, Return Demonstration





Discharge Recommendations


Target Placement


Home with assistance prn





Time/GCodes


Time In:  1123


Time Out:  1143


Total Billed Treatment Time:  20


Total Billed Treatment


Visit, BRAYAN Posadas PT                Dec 20, 2021 11:49

## 2021-12-20 NOTE — PROGRESS NOTE - CARDIOLOGY
Cardiology SOAP Progress Note


Objective:


I&O/Vital Signs











 21





 21:00 21:10 00:00 01:01


 


Temp   37.2 


 


Pulse   95 97


 


Resp   20 


 


B/P (MAP)   145/63 


 


Pulse Ox 95 95 93 


 


O2 Delivery Nasal Cannula Nasal Cannula Nasal Cannula 


 


O2 Flow Rate 4.00 3.00 4.00 


 


    





 21 





 04:00 07:00 08:10 


 


Temp 37.5  37.0 


 


Pulse 103 87 90 


 


Resp 21  17 


 


B/P (MAP) 143/73  161/83 


 


Pulse Ox 92  94 


 


O2 Delivery   Room Air 


 


O2 Flow Rate 4.00   














 21





 00:00


 


Intake Total 400 ml


 


Output Total 2800 ml


 


Balance -2400 ml








Constitutional:  AAO x 3, well-developed, well-nourished


Respiratory:  No accessory muscle use; other (good air entry on both sides, 

diminished at the bases)


Cardiovascular:  regular rate-rhythm, S1 and S2, systolic murmur (soft NITHYA at 

card base)


Gastrointestional:  No tender; soft; No guarding, No rebound; audible bowel 

sounds


Extremities:  swelling (2+ leg edema; mild to mod scrotal swelling on both sides

); No clubbing, No cyanosis


Neurologic/Psychiatric:  oriented x 3, other (moves all limbs equally)


Skin:  No rash, No ulcerations





Results/Procedures:


Labs


Laboratory Tests


21 04:42: 


White Blood Count 8.5, Red Blood Count 4.69, Hemoglobin 15.4, Hematocrit 45, 

Mean Corpuscular Volume 97, Mean Corpuscular Hemoglobin 33, Mean Corpuscular 

Hemoglobin Concent 34, Red Cell Distribution Width 14.3, Platelet Count 95L, 

Mean Platelet Volume 13.1H, Percent Immature Platelet Fraction 6.3, Sodium Level

136, Potassium Level 4.0, Chloride Level 102, Carbon Dioxide Level 22, Anion Gap

12, Blood Urea Nitrogen 31H, Creatinine 1.10, Estimat Glomerular Filtration Rate

63, BUN/Creatinine Ratio 28, Glucose Level 89, Calcium Level 8.4L, Corrected 

Calcium 9.4, Total Bilirubin 1.9H, Aspartate Amino Transf (AST/SGOT) 53H, 

Alanine Aminotransferase (ALT/SGPT) 39, Alkaline Phosphatase 108, Total Protein 

5.7L, Albumin 2.8L





Microbiology


12/19/21 MRSA Screen - Final, Complete


           MRSA not isolated





Procedures


NAME:   LIZZIE CURRY


[facility] REC#:   [id number]


ACCOUNT#:   [id number]


PT STATUS:   ADM IN


:   1935


PHYSICIAN:   ROBERTH NIEVES MD, MA, FACP, FACC, FSCAI, CCDS


ADMIT DATE:   21/[facility]


***Signed***


Date of Exam:21





CHEST PA/LAT (2 VIEW)








INDICATION: Cardiac device placement.





TIME OF EXAM: 12:49 PM





Correlation is made prior chest 2021.





Heart is enlarged. Cardiac pacer has been placed has lead tips in


region of right atrium and right ventricle. Lungs appear clear.


No infiltrate, effusion or pneumothorax is seen.





IMPRESSION: Pacemaker placement. No pneumothorax is identified.





Dictated by: 





  Dictated on workstation # [id number]








Dict:   21 [id number]


Trans:   21 1528


[facility] [id number]





Interpreted by:     JANNETTE CALZADA MD


Electronically signed by: JANNETTE CALZADA MD 21 1528





A/P:


Assessment:





Complete heart block


- s/p dual chamber PPM implanted on 21





Coronary artery disease


- Cardiac cath 2009: with a history of drug-eluting stenting of the mid 

left anterior descending


- Cardiac cath May 2009: OSIRIS of the left circumflex obtuse marginal .


- Refuses MPI.








ICM:


- Echocardiogram from 2013 showed LVEF 45-50%.


- Most recent echo of May 2019 showed LVEF 50-55%. Grade 1 diastolic dysfun

ction. Mild to MR. Mild AoR. Small amt of pericardial effusion that does not 

appear to be of hemodynamic signif. RVSP approx 27 mmHg








Mild chronic renal insufficiency


- stable. Managed by PCP








Carotid dz


- Carotid u/s of May 2019 showed 60-79% R ICA stenosis. Approx 50% L ICA 

stenosis.








Medication Intolerance:


- Intolerance to ACE inhibitors on account of cough.





Hypertension


- controlled





Hyperlipidemia


- being treated with lovastatin. Managed by PCP





Abnormal ECG


- ECG of 19 shows NSR with Wenckeback AV block, LBBB, and isolated PVCs





Chronic right inguinal hernia


- for which he has opted not to have surgery





Frequent urination,


- chronic, for which he follows with his PCP








Chronic hardness of hearing


Plan:





* S/P dual chamber PPM implanted on 21


* Diuretics as needed and as tolerated


* Monitor labs





Clinical Quality Measures


AMI/AHF:


ASA po Prior to arrival:  JUAN MANUEL Cox           Dec 20, 2021 08:40

## 2021-12-20 NOTE — PROGRESS NOTE - CARDIOLOGY
Cardiology SOAP Progress Note


Subjective:


No cp or palp or syncope


Some gen malaise present


Shortness of breath and swelling are better but not resolved





Objective:


I&O/Vital Signs











 12/20/21 12/20/21 12/20/21 12/20/21





 00:00 01:01 04:00 07:00


 


Temp 37.2  37.5 


 


Pulse 95 97 103 87


 


Resp 20  21 


 


B/P (MAP) 145/63  143/73 


 


Pulse Ox 93  92 


 


O2 Delivery Nasal Cannula   


 


O2 Flow Rate 4.00  4.00 


 


    





 12/20/21   





 08:10   


 


Temp 37.0   


 


Pulse 90   


 


Resp 17   


 


B/P (MAP) 161/83   


 


Pulse Ox 94   


 


O2 Delivery Room Air   














 12/19/21





 23:59


 


Intake Total 400 ml


 


Output Total 2800 ml


 


Balance -2400 ml








Side:  left


Device Insertion Site:  without hematoma, no signs of inflammation, other (mild 

swelling)


Drainage:  No


Bruising:  mild bruising


Constitutional:  AAO x 3, well-developed, well-nourished


Respiratory:  No accessory muscle use; other (good air entry on both sides, 

diminished at the bases)


Cardiovascular:  regular rate-rhythm, S1 and S2, systolic murmur (soft NITHYA at 

card base)


Gastrointestional:  No tender; soft; No guarding, No rebound; audible bowel 

sounds


Extremities:  swelling (2+ leg edema; mild to mod scrotal swelling on both 

sides); No clubbing, No cyanosis


Neurologic/Psychiatric:  oriented x 3, other (moves all limbs equally)


Skin:  No rash, No ulcerations





Results/Procedures:


Labs


Laboratory Tests


12/20/21 04:42: 


White Blood Count 8.5, Red Blood Count 4.69, Hemoglobin 15.4, Hematocrit 45, 

Mean Corpuscular Volume 97, Mean Corpuscular Hemoglobin 33, Mean Corpuscular 

Hemoglobin Concent 34, Red Cell Distribution Width 14.3, Platelet Count 95L, 

Mean Platelet Volume 13.1H, Percent Immature Platelet Fraction 6.3, Sodium Level

136, Potassium Level 4.0, Chloride Level 102, Carbon Dioxide Level 22, Anion Gap

12, Blood Urea Nitrogen 31H, Creatinine 1.10, Estimat Glomerular Filtration Rate

63, BUN/Creatinine Ratio 28, Glucose Level 89, Calcium Level 8.4L, Corrected 

Calcium 9.4, Total Bilirubin 1.9H, Aspartate Amino Transf (AST/SGOT) 53H, 

Alanine Aminotransferase (ALT/SGPT) 39, Alkaline Phosphatase 108, Total Protein 

5.7L, Albumin 2.8L





Microbiology


12/19/21 MRSA Screen - Final, Complete


           MRSA not isolated








A/P:


Assessment:





Complete heart block


- s/p dual chamber PPM implanted on 12-19-21





Coronary artery disease


- Cardiac cath April 2009: with a history of drug-eluting stenting of the mid 

left anterior descending


- Cardiac cath May 2009: OSIRIS of the left circumflex obtuse marginal .


- Refuses MPI.








ICM:


- Echocardiogram from December 2013 showed LVEF 45-50%.


- Most recent echo of May 2019 showed LVEF 50-55%. Grade 1 diastolic 

dysfunction. Mild to MR. Mild AoR. Small amt of pericardial effusion that does 

not appear to be of hemodynamic signif. RVSP approx 27 mmHg








Mild chronic renal insufficiency


- stable. Managed by PCP








Carotid dz


- Carotid u/s of May 2019 showed 60-79% R ICA stenosis. Approx 50% L ICA 

stenosis.








Medication Intolerance:


- Intolerance to ACE inhibitors on account of cough.





Hypertension


- controlled





Hyperlipidemia


- being treated with lovastatin. Managed by PCP





Abnormal ECG


- ECG of 4/1/19 shows NSR with Wenckeback AV block, LBBB, and isolated PVCs





Chronic right inguinal hernia


- for which he has opted not to have surgery





Frequent urination,


- chronic, for which he follows with his PCP








Chronic hardness of hearing


Plan:





* S/P dual chamber PPM implanted on 12-19-21


* Diuretics as needed and as tolerated


* BP not well controlled, episodes of sinus tachycardia - add BB 


* Monitor labs





Clinical Quality Measures


AMI/AHF:


ASA po Prior to arrival:  ROBERTH Bonilla MD FACP [facility] CCDS   Dec 20, 2021 09:14

## 2021-12-20 NOTE — PROGRESS NOTE - CARDIOLOGY
Cardiology SOAP Progress Note


Subjective:


Sitting up in recliner at the bedside


States he has back pain and mild pain at the device insertion site


Feels the LE swelling has improved


Wants to go home





Objective:


I&O/Vital Signs











 12/20/21 12/20/21 12/20/21 12/20/21





 04:00 07:00 08:10 08:55


 


Temp 37.5  37.0 


 


Pulse 103 87 90 


 


Resp 21  17 


 


B/P (MAP) 143/73  161/83 


 


Pulse Ox 92  94 


 


O2 Delivery   Room Air Room Air


 


O2 Flow Rate 4.00   


 


    





 12/20/21 12/20/21 12/20/21 





 12:20 12:33 13:45 


 


Temp 37.0   


 


Pulse 98 100  


 


Resp 18   


 


B/P (MAP) 136/76   


 


Pulse Ox 94   


 


O2 Delivery Room Air  Room Air 


 


O2 Flow Rate   0.00 














 12/19/21





 23:59


 


Intake Total 400 ml


 


Output Total 2800 ml


 


Balance -2400 ml








Side:  left


Device Insertion Site:  without hematoma, no signs of inflammation, other (mild 

swelling)


Drainage:  No


Bruising:  mild bruising


Constitutional:  AAO x 3, well-developed, well-nourished


Respiratory:  No accessory muscle use; other (good air entry on both sides, 

diminished at the bases)


Cardiovascular:  regular rate-rhythm, S1 and S2, systolic murmur (soft NITHYA at 

card base)


Gastrointestional:  No tender; soft; No guarding, No rebound; audible bowel 

sounds


Extremities:  swelling (2+ leg edema; mild to mod scrotal swelling on both 

sides); No clubbing, No cyanosis


Neurologic/Psychiatric:  oriented x 3, other (moves all limbs equally)


Skin:  No rash, No ulcerations





Results/Procedures:


Labs


Laboratory Tests


12/20/21 04:42: 


White Blood Count 8.5, Red Blood Count 4.69, Hemoglobin 15.4, Hematocrit 45, 

Mean Corpuscular Volume 97, Mean Corpuscular Hemoglobin 33, Mean Corpuscular 

Hemoglobin Concent 34, Red Cell Distribution Width 14.3, Platelet Count 95L, 

Mean Platelet Volume 13.1H, Percent Immature Platelet Fraction 6.3, Sodium Level

136, Potassium Level 4.0, Chloride Level 102, Carbon Dioxide Level 22, Anion Gap

12, Blood Urea Nitrogen 31H, Creatinine 1.10, Estimat Glomerular Filtration Rate

63, BUN/Creatinine Ratio 28, Glucose Level 89, Calcium Level 8.4L, Corrected 

Calcium 9.4, Total Bilirubin 1.9H, Aspartate Amino Transf (AST/SGOT) 53H, Ala

nine Aminotransferase (ALT/SGPT) 39, Alkaline Phosphatase 108, Total Protein 

5.7L, Albumin 2.8L





Microbiology


12/19/21 MRSA Screen - Final, Complete


           MRSA not isolated








A/P:


Assessment:





Complete heart block


- s/p dual chamber PPM implanted on 12-19-21





Coronary artery disease


- Cardiac cath April 2009: with a history of drug-eluting stenting of the mid 

left anterior descending


- Cardiac cath May 2009: OSIRIS of the left circumflex obtuse marginal .


- Refuses MPI.








ICM:


- Echocardiogram from December 2013 showed LVEF 45-50%.


- Most recent echo of May 2019 showed LVEF 50-55%. Grade 1 diastolic 

dysfunction. Mild to MR. Mild AoR. Small amt of pericardial effusion that does 

not appear to be of hemodynamic signif. RVSP approx 27 mmHg








Mild chronic renal insufficiency


- stable. Managed by PCP








Carotid dz


- Carotid u/s of May 2019 showed 60-79% R ICA stenosis. Approx 50% L ICA 

stenosis.








Medication Intolerance:


- Intolerance to ACE inhibitors on account of cough.





Hypertension


- controlled





Hyperlipidemia


- being treated with lovastatin. Managed by PCP





Abnormal ECG


- ECG of 4/1/19 shows NSR with Wenckeback AV block, LBBB, and isolated PVCs





Chronic right inguinal hernia


- for which he has opted not to have surgery





Frequent urination,


- chronic, for which he follows with his PCP








Chronic hardness of hearing


Plan:





* S/P dual chamber PPM implanted on 12-19-21


* Diuretics as needed and as tolerated


* BP not well controlled, episodes of sinus tachycardia - add BB 


* Monitor labs





Clinical Quality Measures


AMI/AHF:


ASA po Prior to arrival:  JUAN MANUEL Cox           Dec 20, 2021 08:50

## 2021-12-20 NOTE — PROGRESS NOTE - HOSPITALIST
MURRAY BUCKLEY 12/20/21 1138:


Subjective


HPI/CC On Admission


Date Seen by Provider:  Dec 20, 2021


Time Seen by Provider:  08:10


Subjective/Events-last exam


Patient had a pacemaker placed yesterday. Is feeling pretty good. Does have some

discomfort from the procedure. Patient did have some complaints about right eye 

pain, wished to have a warm wash cloth.   Says he is ready to go home. Patient 

does have questions about the COVID vaccine. Patient was having his catheter 

removed as I left the room.





Review of Systems


Pulmonary:  No Dyspnea, No Cough


Cardiovascular:  No: Chest Pain, Palpitations


Gastrointestinal:  No: Nausea, Vomiting





Objective


Exam


Vital Signs





Vital Signs








  Date Time  Temp Pulse Resp B/P (MAP) Pulse Ox O2 Delivery O2 Flow Rate FiO2


 


12/20/21 08:55      Room Air  


 


12/20/21 08:10 37.0 90 17 161/83 94   


 


12/20/21 04:00       4.00 





Capillary Refill : Less Than 3 Seconds


General Appearance:  No Apparent Distress, WD/WN


Respiratory:  Chest Non Tender, Lungs Clear, Normal Breath Sounds, No Accessory 

Muscle Use, No Respiratory Distress


Cardiovascular:  Regular Rate, Rhythm, Normal Peripheral Pulses


Rectal:  Deferred


Neurologic/Psychiatric:  Alert, Oriented x3, Normal Mood/Affect





Results/Procedures


Lab


Laboratory Tests


12/20/21 04:42








Patient resulted labs reviewed.





Assessment/Plan


Assessment and Plan


Assess & Plan/Chief Complaint


Assessment


Complete heart block


Coronary Artery disease


Chronic heart failure


Chronic renal insufficiency 


History of Carotid disease


Intertrigo of genitocrural region 


Hypertension


Hyperlipidemia


Chronic right inguinal hernia - has previously elected not to repair





Plan


Pacemaker placed 12/19 by cardiology


Lasix (per cardiology)


HTN not well controlled with home meds, add a beta-blocker (per cardiology)


Home medications


Continue home medications


PT/OT


Pharmacy to answer questions about COVID vaccination





Clinical Quality Measures


AMI/AHF:


ASA po Prior to arrival:  No





WENDI COOPER DO 12/21/21 0545:


Subjective


Subjective/Events-last exam


Pt doing well since pacemaker placed 


He declined a cardiac catheterization 


Platelets are 95,000


PT and OT will be ordered 


Ready for discharge tomorrow


Review of Systems


General:  Fatigue, Malaise





Objective


Exam


General Appearance:  No Apparent Distress, WD/WN, Chronically ill


Respiratory:  Lungs Clear, Normal Breath Sounds


Cardiovascular:  Regular Rate, Rhythm


Neurologic/Psychiatric:  Alert, Oriented x3





Assessment/Plan


Assessment and Plan


Assess & Plan/Chief Complaint


Complete heart block management appreciated


Discharge home tomorrow





Supervisory-Addendum Brief


Verification & Attestation


Participated in pt care:  history, MDM, physical


Personally performed:  exam, history, MDM, supervision of care


Care discussed with:  Medical Student


Procedures:  n/a


Results interpretation:  Verified all documentation


Verification and Attestation of Medical Student E/M Service





A medical student performed and documented this service in my presence. I 

reviewed and verified all information documented by the medical student and made

modifications to such information, when appropriate. I personally performed the 

physical exam and medical decision making. 





 Wendi Cooper, Dec 21, 2021,05:44











MURRAY BUCKLEY                 Dec 20, 2021 11:38


WENDI COOPER DO                Dec 21, 2021 05:45

## 2021-12-21 LAB
ALBUMIN SERPL-MCNC: 3.1 GM/DL (ref 3.2–4.5)
ALP SERPL-CCNC: 126 U/L (ref 40–136)
ALT SERPL-CCNC: 40 U/L (ref 0–55)
BASOPHILS # BLD AUTO: 0 10^3/UL (ref 0–0.1)
BASOPHILS NFR BLD AUTO: 1 % (ref 0–10)
BILIRUB SERPL-MCNC: 2.3 MG/DL (ref 0.1–1)
BUN/CREAT SERPL: 32
CALCIUM SERPL-MCNC: 8.6 MG/DL (ref 8.5–10.1)
CHLORIDE SERPL-SCNC: 101 MMOL/L (ref 98–107)
CO2 SERPL-SCNC: 24 MMOL/L (ref 21–32)
CREAT SERPL-MCNC: 0.91 MG/DL (ref 0.6–1.3)
EOSINOPHIL # BLD AUTO: 0.4 10^3/UL (ref 0–0.3)
EOSINOPHIL NFR BLD AUTO: 5 % (ref 0–10)
GFR SERPLBLD BASED ON 1.73 SQ M-ARVRAT: 79 ML/MIN
GLUCOSE SERPL-MCNC: 91 MG/DL (ref 70–105)
HCT VFR BLD CALC: 49 % (ref 40–54)
HGB BLD-MCNC: 16.4 G/DL (ref 13.3–17.7)
LYMPHOCYTES # BLD AUTO: 0.4 10^3/UL (ref 1–4)
LYMPHOCYTES NFR BLD AUTO: 5 % (ref 12–44)
MANUAL DIFFERENTIAL PERFORMED BLD QL: NO
MCH RBC QN AUTO: 33 PG (ref 25–34)
MCHC RBC AUTO-ENTMCNC: 33 G/DL (ref 32–36)
MCV RBC AUTO: 97 FL (ref 80–99)
MONOCYTES # BLD AUTO: 0.8 10^3/UL (ref 0–1)
MONOCYTES NFR BLD AUTO: 10 % (ref 0–12)
NEUTROPHILS # BLD AUTO: 6.3 10^3/UL (ref 1.8–7.8)
NEUTROPHILS NFR BLD AUTO: 79 % (ref 42–75)
PLATELET # BLD: 95 10^3/UL (ref 130–400)
PMV BLD AUTO: 12.7 FL (ref 9–12.2)
POTASSIUM SERPL-SCNC: 3.9 MMOL/L (ref 3.6–5)
PROT SERPL-MCNC: 6.3 GM/DL (ref 6.4–8.2)
SODIUM SERPL-SCNC: 137 MMOL/L (ref 135–145)
WBC # BLD AUTO: 7.9 10^3/UL (ref 4.3–11)

## 2021-12-21 RX ADMIN — ASPIRIN 81 MG CHEWABLE TABLET SCH MG: 81 TABLET CHEWABLE at 08:19

## 2021-12-21 RX ADMIN — NYSTATIN SCH GM: 100000 OINTMENT TOPICAL at 08:19

## 2021-12-21 RX ADMIN — FUROSEMIDE SCH MG: 20 TABLET ORAL at 08:19

## 2021-12-21 RX ADMIN — CIPROFLOXACIN SCH MG: 500 TABLET, FILM COATED ORAL at 08:19

## 2021-12-21 RX ADMIN — Medication SCH ML: at 13:34

## 2021-12-21 RX ADMIN — SIMVASTATIN SCH MG: 10 TABLET, FILM COATED ORAL at 08:19

## 2021-12-21 RX ADMIN — Medication SCH ML: at 05:09

## 2021-12-21 RX ADMIN — VANCOMYCIN HYDROCHLORIDE SCH MLS/HR: 500 INJECTION, POWDER, LYOPHILIZED, FOR SOLUTION INTRAVENOUS at 11:44

## 2021-12-21 RX ADMIN — AMLODIPINE BESYLATE SCH MG: 5 TABLET ORAL at 08:19

## 2021-12-21 NOTE — PROGRESS NOTE - CARDIOLOGY
Cardiology SOAP Progress Note


Subjective:


Sitting up in the recliner


Wants to go home


Feels well


No c/o CP, device insertion site pain


Feels LE swelling is greatly improved





Objective:


I&O/Vital Signs











 12/21/21 12/21/21 12/21/21 12/21/21





 00:00 01:00 03:33 04:49


 


Temp 36.7  36.8 


 


Pulse 89 102 98 


 


Resp 17  15 


 


B/P (MAP) 152/82  133/89 


 


Pulse Ox 94  92 


 


O2 Delivery Room Air  Room Air Room Air


 


    





 12/21/21 12/21/21 12/21/21 12/21/21





 07:00 07:47 07:57 08:24


 


Temp   36.8 


 


Pulse 105 85 104 


 


Resp  17 18 


 


B/P (MAP)  153/91 119/112 


 


Pulse Ox  99  


 


O2 Delivery  Room Air Room Air Room Air














 12/21/21





 00:00


 


Intake Total 1200 ml


 


Output Total 400 ml


 


Balance 800 ml








Side:  left


Device Insertion Site:  without hematoma, no signs of inflammation, other (mild 

swelling)


Drainage:  No


Bruising:  mild bruising


Constitutional:  AAO x 3, well-developed, well-nourished


Respiratory:  No accessory muscle use; other (good air entry on both sides, 

diminished at the bases)


Cardiovascular:  regular rate-rhythm, S1 and S2, systolic murmur (soft NITHYA at 

card base)


Gastrointestional:  No tender; soft; No guarding, No rebound; audible bowel 

sounds


Extremities:  swelling (LE swelling improved - mod bilat); No clubbing, No 

cyanosis


Neurologic/Psychiatric:  oriented x 3, other (moves all limbs equally)


Skin:  No rash, No ulcerations





Results/Procedures:


Labs


Laboratory Tests


12/21/21 07:09: 


White Blood Count 7.9, Red Blood Count 5.05, Hemoglobin 16.4, Hematocrit 49, 

Mean Corpuscular Volume 97, Mean Corpuscular Hemoglobin 33, Mean Corpuscular 

Hemoglobin Concent 33, Red Cell Distribution Width 14.2, Platelet Count 95L, 

Mean Platelet Volume 12.7H, Immature Granulocyte % (Auto) 0, Neutrophils (%) 

(Auto) 79H, Lymphocytes (%) (Auto) 5L, Monocytes (%) (Auto) 10, Eosinophils (%) 

(Auto) 5, Basophils (%) (Auto) 1, Neutrophils # (Auto) 6.3, Lymphocytes # (Auto)

0.4L, Monocytes # (Auto) 0.8, Eosinophils # (Auto) 0.4H, Basophils # (Auto) 0.0,

Immature Granulocyte # (Auto) 0.0, Percent Immature Platelet Fraction 6.8, 

Sodium Level 137, Potassium Level 3.9, Chloride Level 101, Carbon Dioxide Level 

24, Anion Gap 12, Blood Urea Nitrogen 29H, Creatinine 0.91, Estimat Glomerular 

Filtration Rate 79, BUN/Creatinine Ratio 32, Glucose Level 91, Calcium Level 

8.6, Corrected Calcium 9.3, Total Bilirubin 2.3H, Aspartate Amino Transf 

(AST/SGOT) 49H, Alanine Aminotransferase (ALT/SGPT) 40, Alkaline Phosphatase 

126, Total Protein 6.3L, Albumin 3.1L





Microbiology


12/19/21 MRSA Screen - Final, Complete


           MRSA not isolated





Laboratory Tests


12/20/21 04:42








12/21/21 07:09











A/P:


Assessment:





Complete heart block


- s/p dual chamber PPM implanted on 12-19-21





Coronary artery disease


- Cardiac cath April 2009: with a history of drug-eluting stenting of the mid 

left anterior descending


- Cardiac cath May 2009: OSIRIS of the left circumflex obtuse marginal .


- Refuses MPI.








ICM:


- Echocardiogram from December 2013 showed LVEF 45-50%.


- Most recent echo of May 2019 showed LVEF 50-55%. Grade 1 diastolic 

dysfunction. Mild to MR. Mild AoR. Small amt of pericardial effusion that does 

not appear to be of hemodynamic signif. RVSP approx 27 mmHg








Mild chronic renal insufficiency


- stable. Managed by PCP








Carotid dz


- Carotid u/s of May 2019 showed 60-79% R ICA stenosis. Approx 50% L ICA 

stenosis.








Medication Intolerance:


- Intolerance to ACE inhibitors on account of cough.





Hypertension


- controlled





Hyperlipidemia


- being treated with lovastatin. Managed by PCP





Abnormal ECG


- ECG of 4/1/19 shows NSR with Wenckeback AV block, LBBB, and isolated PVCs





Chronic right inguinal hernia


- for which he has opted not to have surgery





Frequent urination,


- chronic, for which he follows with his PCP








Chronic hardness of hearing


Plan:





* S/P dual chamber PPM implanted on 12-19-21


* Continue current medication regimen including antihypertensive regimen and 

  diuretics


* Advise out pt f/u for dressing change at our office tomorrow


* Advise out pt f/u in 4 weeks


* OK to d/c home from cardiac stand point





Clinical Quality Measures


AMI/AHF:


ASA po Prior to arrival:  JUAN MANUEL Cox           Dec 21, 2021 10:22

## 2021-12-21 NOTE — DISCHARGE SUMMARY
Discharge Summary


Hospital Course


Was the Problem List Reviewed?:  Yes


Problems/Dx:  


(1) Pacemaker


(2) Complete heart block


Status:  Acute


Hospital Course


Date of Admission: Dec 18, 2021 at 15:06 


Admission Diagnosis :  





Family Physician/Provider: No,Local Physician  





Date of Discharge: 12/21/21 


Discharge Diagnosis: Third-degree heart block, pacemaker, CAD, declined cardiac 

cath, right-sided conjunctivitis








Hospital Course:


Hospital Course: Pt had an uneventful 4 day hospital course when he presented 

with 3rd degree AV block and CHF. He met criteria for pacemaker placement and 

that was done by Dr. Evans. Right eye conjunctivitis required Cipro eye drops 

after wound swab taken. Pt was deemed stable, will have close follow up and will

monitor closely.














Labs and Pending Lab Test:


Laboratory Tests


12/21/21 07:09: 


White Blood Count 7.9, Red Blood Count 5.05, Hemoglobin 16.4, Hematocrit 49, 

Mean Corpuscular Volume 97, Mean Corpuscular Hemoglobin 33, Mean Corpuscular 

Hemoglobin Concent 33, Red Cell Distribution Width 14.2, Platelet Count 95L, 

Mean Platelet Volume 12.7H, Immature Granulocyte % (Auto) 0, Neutrophils (%) 

(Auto) 79H, Lymphocytes (%) (Auto) 5L, Monocytes (%) (Auto) 10, Eosinophils (%) 

(Auto) 5, Basophils (%) (Auto) 1, Neutrophils # (Auto) 6.3, Lymphocytes # (Auto)

0.4L, Monocytes # (Auto) 0.8, Eosinophils # (Auto) 0.4H, Basophils # (Auto) 0.0,

Immature Granulocyte # (Auto) 0.0, Percent Immature Platelet Fraction 6.8, Sod

ium Level 137, Potassium Level 3.9, Chloride Level 101, Carbon Dioxide Level 24,

Anion Gap 12, Blood Urea Nitrogen 29H, Creatinine 0.91, Estimat Glomerular 

Filtration Rate 79, BUN/Creatinine Ratio 32, Glucose Level 91, Calcium Level 8.

6, Corrected Calcium 9.3, Total Bilirubin 2.3H, Aspartate Amino Transf 

(AST/SGOT) 49H, Alanine Aminotransferase (ALT/SGPT) 40, Alkaline Phosphatase 

126, Total Protein 6.3L, Albumin 3.1L





Microbiology


12/19/21 MRSA Screen - Final, Complete


           MRSA not isolated





Home Meds


Active


Furosemide 20 Mg Tablet 20 Mg PO DAILY


Cipro (Ciprofloxacin HCl) 500 Mg Tablet 500 Mg PO BID 5 Days


Reported


Tylenol Extra Strength (Acetaminophen) 500 Mg Tablet 1,000 Mg PO HS


Aspirin EC (Aspirin) 81 Mg Tablet.dr 81 Mg PO DAILY


Acid Reducer (FAMOTIDINE) (Famotidine) 20 Mg Tablet 20 Mg PO DAILY


Losartan Potassium 100 Mg Tablet 100 Mg PO 1800 BEFORE DINNER


Metoprolol Succinate 100 Mg Tab.er.24h 100 Mg PO DAILY


Lovastatin 20 Mg Tablet 20 Mg PO HS


Assessment/Pt Instructions


PCP in 2 weeks


Discharge Planning:  <30 minutes discharge planning





Discharge Instructions


Discharge Diet:  No Restrictions





Discharge Physical Examination


Vital Signs





Vital Signs








  Date Time  Temp Pulse Resp B/P (MAP) Pulse Ox O2 Delivery O2 Flow Rate FiO2


 


12/21/21 08:24      Room Air  


 


12/21/21 07:57 36.8 104 18 119/112    


 


12/21/21 07:47     99   


 


12/20/21 13:45       0.00 








General Appearance:  No Apparent Distress, WD/WN, Chronically ill


Allergies:  


Coded Allergies:  


     Penicillins (Unverified  Allergy, Mild, 4/12/09)


     Sulfa (Sulfonamide Antibiotics) (Verified  Allergy, Unknown, 4/12/09)





Discharge Summary


Date of Admission


Dec 18, 2021 at 15:06


Date of Discharge





Discharge Date:  Dec 21, 2021


Discharge Diagnosis


Complete heart block management appreciated


Discharge home tomorrow





Clinical Quality Measures


AMI/AHF:


ASA po Prior to arrival:  MONIQUE Bower DO                Dec 21, 2021 10:17

## 2021-12-21 NOTE — PHYSICAL THERAPY DAILY NOTE
PT Daily Note-Current


Subjective


Patient lying supine in bed upon PT arrival, reports his left shoulder/arm is 

hurting, rates it at 5/10.





Mental Status


Patient Orientation:  Person, Place, Time, Situation





Transfers


SCALE: Activities may be completed with or without assistive devices.





6-Indepedent-patient completes the activity by him/herself with no assistance 

from a helper.


5-Set-up or Clean-up Assistance-helper sets up or cleans up; patient completes 

activity. Franklin assists only prior to or  


    following the activity.


4-Supervision or Touching Assistance-helper provides verbal cues and/or 

touching/steadying and/or contact guard assistance as patient completes 

activity. Assistance may be provided   


    throughout the activity or intermittently.


3-Partial/Moderate Assistance-helper does LESS THAN HALF the effort. Franklin 

lifts, holds or supports trunk or limbs, but provides less than half the effort.


2-Substantial/Maximal Assistance-helper does MORE THAN HALF the effort. Franklin 

lifts or holds trunk or limbs and provides more than half the effort.


8-[id number] does ALL the effort. Patient does none of the effort to 

complete the activity. Or, the assistance of 2 or more helpers is required for 

the patient to complete the  


    activity.


If activity was not attempted, code reason:


7-Patient Refused.


9-Not Applicable-not attempted and the patient did not perform the activity 

before the current illness, exacerbation or injury.


10-Not Attempted due to Environmental Limitations-(lack of equipment, weather 

restraints, etc.).


88-Not Attempted due to Medical Conditions or Safety Concerns.


Roll Left & Right (QC):  4


Sit to Lying (QC):  4


Lying to Sitting/Side of Bed(Q:  4


Sit to Stand (QC):  4


Chair/Bed-to-Chair Xfer(QC):  4


Toilet Transfer (QC):  4





Gait Training


Does the Patient Walk?:  Yes


Distance:  200


Walk 10 feet (QC):  5


Walk 50 ft with 2 Turns(QC):  5


Walk 150 ft (QC):  5


Gait Assistive Device:  None





Exercises


Supine Ex:  Ankle pumps, Quad Set, Glut sets, Heel Slides, Short Arc Quads, 

Straight leg raise, Hip abd/add


Supine Reps:  20





Assessment


Current Status:  Good Progress


Patient tolerated treatment well except for SLR bilaterally.  Reports since he 

fell a few weeks ago his thighs/hips have hurt and the SLR seems to aggravate 

that pain.  Patient performs LE therapeutic exercise as listed above.  Patient 

performs all observed bed mobility and transfers with SBA.  He ambulates 200 

feet with no AD, with SBA and verbal cues for posture, not using the left UE and

conservation of energy.





PT Long Term Goals


Long Term Goals


PT Long Term Goals Time Frame:  Dec 31, 2021


Roll Left & Right (QC):  6


Sit to Lying (QC):  6


Lying-Sitting on Side/Bed(QC):  6


Sit to Stand (QC):  6


Chair/Bed-to-Chair Xfer(QC):  6


Toilet Transfer (QC):  6


Does the Patient Walk:  Yes


Walk 10 feet (QC):  6


Walk 50ft with 2 Turns (QC):  6


Walk 150 ft (QC):  6


1 Step (curb) (QC):  6


4 Steps (QC):  6





PT Plan


Treatment/Plan


Treatment Plan:  Continue Plan of Care


Treatment Plan:  Bed Mobility, Education, Functional Activity Leslie, Functional 

Strength, Group Therapy, Gait, Safety, Therapeutic Exercise, Transfers


Treatment Duration:  Dec 31, 2021


Frequency:  6 times per week


Estimated Hrs Per Day:  .25 hour per day





Safety Risks/Education


Patient Education:  Gait Training


Teaching Recipient:  Patient


Teaching Methods:  Demonstration, Discussion


Response to Teaching:  Verbalize Understanding, Return Demonstration





Time/GCodes


Time In:  920


Time Out:  950


Total Billed Treatment Time:  30


Total Billed Treatment


Visit, Heladio Figueroa JOHN A PT                Dec 21, 2021 10:36

## 2021-12-21 NOTE — PHYSICIAN QUERY CLARIFICATION
Physician Query-General


Query to Physician:


The medical record reflects the following clinical scenario:





The patient, in the setting of History/Risk factors, CAD, HTN, admitted with 

complete heart block


Clinical Findings BNP 3652, SOB and edema that has been increasing PTA, Per 

current cardiology documentation: "Most recent echo of May 2019 showed LVEF 50-

55%. Grade 1 diastolic dysfunction."


Treatment Multiple doses of IV Lasix, Cardiology consult, PPM insertion  





Question:  Can you further specify CHF, "acute", (Per Dr. DYLON Garcia on the H and 

P) and Chronic Heart failure (per 12/20/2021 Progress note) per the clinical 

indicators above? 


Please document your response in the Progress Notes or Discharge Summary.





1.  Acute on Chronic Diastolic Heart failure, present on admission





2.  Other, with explanation of clinical findings





3.  Clinically undetermined, no explanation for clinical findings





Please clarify and document your clinical opinion in the Progress Notes and 

Discharge Summary including the definitive and/or presumptive diagnosis, 

(suspected or probable), related to the above clinical findings. Please include 

clinical findings supporting your diagnosis.


In responding to this query, please exercise your independent professional 

judgment.  The purpose of this communication is to more accurately reflect the 

complexity of your patients condition. The fact that a question is asked does 

not imply that any particular answer is desired or expected.  





Please remember a lack of response to the above will prompt a phone page by 

CDI/coding staff.


Thank you for timely response to this clarification.   


      


Elida Tse MSN, RN


Clinical 


[phone]


[email or web address]





PHYSICIAN RESPONSE:


Based on the clinical findings in the record, please respond to the query above 

on this document as an addendum. 








Physician Response:


Physician Response


1














If you have questions please contact:


                   


:


Ext:





Thank you for your time and cooperation.


Clinical /








*********************This is a permanent part of the medical 

record*******************











ELIDA TSE                   Dec 21, 2021 13:11


MONIQUE COOPER DO                Dec 21, 2021 19:29

## 2021-12-21 NOTE — PROGRESS NOTE
MURRAY BUCKLEY 12/21/21 1144:


Progress Note


Patient is an 86 year old male who presented to [facility] on 12/19 with complaints of 

SOB that had persisted for approximately one month. Patient has a history of 

coronary artery disease, carotid disease, congestive heart failure, chronic 

renal insufficiency, hypertension, hyperlipidemia, chronic right inguinal 

hernia.  Patient was found to be COVID (-). Patient came from the [facility] 

where patient was seen and told to go to [facility] straight away. Patient was seen by 

Dr. Evans, who gave a diagnosis of a complete heart block and recommended dual 

chamber pacemaker placement. This was placed on 12/19. Patient also found to 

have intertrigo of sacrocrural region, placed on topical nystatin. On 12/20 

patient was placed on diuretics as needed and a beta-blocker was added to his 

hypertension medication regime for episodes of hypertension and tachycardia. 

Patient was ready to go home, but the decision was made to wait one more day. On

12/21 patient was cleared for discharge by cardiology. Patient was having 

irritation and conjunctiva of right eye with discharge. Will be sent home on 

[employer] for this. Patient has instructions to return to Dr. Woodruff office 

tomorrow for wound dressing change. Patient is also to follow up with Dr. Evans

in approximately four weeks.





WENDI COOPER DO 12/22/21 0614:


Supervisory-Addendum Brief


Verification & Attestation


Participated in pt care:  history, MDM, physical


Personally performed:  exam, history, MDM, supervision of care


Care discussed with:  Medical Student


Procedures:  n/a


Results interpretation:  Verified all documentation


Verification and Attestation of Medical Student E/M Service





A medical student performed and documented this service in my presence. I 

reviewed and verified all information documented by the medical student and made

modifications to such information, when appropriate. I personally performed the 

physical exam and medical decision making. 





 Wendi Cooper Dec 22, 2021,06:14











MURRAY BUCKLEY                 Dec 21, 2021 11:44


WENDI COOPER DO                Dec 22, 2021 06:14

## 2023-07-28 ENCOUNTER — HOSPITAL ENCOUNTER (OUTPATIENT)
Dept: HOSPITAL 75 - ER | Age: 88
Setting detail: OBSERVATION
LOS: 1 days | Discharge: HOME | End: 2023-07-29
Attending: INTERNAL MEDICINE | Admitting: INTERNAL MEDICINE
Payer: MEDICARE

## 2023-07-28 VITALS — WEIGHT: 151.68 LBS | HEIGHT: 68.9 IN | BODY MASS INDEX: 22.47 KG/M2

## 2023-07-28 VITALS — SYSTOLIC BLOOD PRESSURE: 141 MMHG | DIASTOLIC BLOOD PRESSURE: 81 MMHG

## 2023-07-28 DIAGNOSIS — K52.9: ICD-10-CM

## 2023-07-28 DIAGNOSIS — K40.31: ICD-10-CM

## 2023-07-28 DIAGNOSIS — Z95.5: ICD-10-CM

## 2023-07-28 DIAGNOSIS — R63.0: ICD-10-CM

## 2023-07-28 DIAGNOSIS — N28.9: ICD-10-CM

## 2023-07-28 DIAGNOSIS — Z79.01: ICD-10-CM

## 2023-07-28 DIAGNOSIS — E86.0: ICD-10-CM

## 2023-07-28 DIAGNOSIS — K21.00: Primary | ICD-10-CM

## 2023-07-28 DIAGNOSIS — Z79.82: ICD-10-CM

## 2023-07-28 DIAGNOSIS — Z95.0: ICD-10-CM

## 2023-07-28 DIAGNOSIS — K29.80: ICD-10-CM

## 2023-07-28 DIAGNOSIS — I45.9: ICD-10-CM

## 2023-07-28 DIAGNOSIS — Z87.891: ICD-10-CM

## 2023-07-28 DIAGNOSIS — Z28.310: ICD-10-CM

## 2023-07-28 DIAGNOSIS — I10: ICD-10-CM

## 2023-07-28 DIAGNOSIS — K26.9: ICD-10-CM

## 2023-07-28 DIAGNOSIS — I25.10: ICD-10-CM

## 2023-07-28 DIAGNOSIS — K92.0: ICD-10-CM

## 2023-07-28 DIAGNOSIS — K44.9: ICD-10-CM

## 2023-07-28 LAB
ALBUMIN SERPL-MCNC: 4.3 GM/DL (ref 3.2–4.5)
ALP SERPL-CCNC: 67 U/L (ref 40–136)
ALT SERPL-CCNC: 18 U/L (ref 0–55)
APTT PPP: YELLOW S
BACTERIA #/AREA URNS HPF: NEGATIVE /HPF
BASOPHILS # BLD AUTO: 0.1 10^3/UL (ref 0–0.1)
BASOPHILS NFR BLD AUTO: 0 % (ref 0–10)
BASOPHILS NFR BLD MANUAL: 0 %
BILIRUB SERPL-MCNC: 1.2 MG/DL (ref 0.1–1)
BILIRUB UR QL STRIP: NEGATIVE
BUN/CREAT SERPL: 41
CALCIUM SERPL-MCNC: 9.8 MG/DL (ref 8.5–10.1)
CHLORIDE SERPL-SCNC: 105 MMOL/L (ref 98–107)
CO2 SERPL-SCNC: 21 MMOL/L (ref 21–32)
CREAT SERPL-MCNC: 1.38 MG/DL (ref 0.6–1.3)
EOSINOPHIL # BLD AUTO: 0.1 10^3/UL (ref 0–0.3)
EOSINOPHIL NFR BLD AUTO: 1 % (ref 0–10)
EOSINOPHIL NFR BLD MANUAL: 1 %
FIBRINOGEN PPP-MCNC: (no result) MG/DL
GFR SERPLBLD BASED ON 1.73 SQ M-ARVRAT: 49 ML/MIN
GLUCOSE SERPL-MCNC: 108 MG/DL (ref 70–105)
GLUCOSE UR STRIP-MCNC: NEGATIVE MG/DL
HCT VFR BLD CALC: 47 % (ref 40–54)
HGB BLD-MCNC: 15.5 G/DL (ref 13.3–17.7)
HYALINE CASTS #/AREA URNS LPF: (no result) /LPF
KETONES UR QL STRIP: NEGATIVE
LEUKOCYTE ESTERASE UR QL STRIP: NEGATIVE
LYMPHOCYTES # BLD AUTO: 1 10^3/UL (ref 1–4)
LYMPHOCYTES NFR BLD AUTO: 8 % (ref 12–44)
MANUAL DIFFERENTIAL PERFORMED BLD QL: YES
MCH RBC QN AUTO: 32 PG (ref 25–34)
MCHC RBC AUTO-ENTMCNC: 33 G/DL (ref 32–36)
MCV RBC AUTO: 96 FL (ref 80–99)
MONOCYTES # BLD AUTO: 1.6 10^3/UL (ref 0–1)
MONOCYTES NFR BLD AUTO: 13 % (ref 0–12)
MONOCYTES NFR BLD: 10 %
NEUTROPHILS # BLD AUTO: 10.2 10^3/UL (ref 1.8–7.8)
NEUTROPHILS NFR BLD AUTO: 78 % (ref 42–75)
NEUTS BAND NFR BLD MANUAL: 74 %
NEUTS BAND NFR BLD: 3 %
NITRITE UR QL STRIP: NEGATIVE
PH UR STRIP: 6 [PH] (ref 5–9)
PLATELET # BLD: 160 10^3/UL (ref 130–400)
PMV BLD AUTO: 11.8 FL (ref 9–12.2)
POTASSIUM SERPL-SCNC: 3.9 MMOL/L (ref 3.6–5)
PROT SERPL-MCNC: 7.6 GM/DL (ref 6.4–8.2)
PROT UR QL STRIP: (no result)
RBC #/AREA URNS HPF: (no result) /HPF
RBC MORPH BLD: NORMAL
SODIUM SERPL-SCNC: 137 MMOL/L (ref 135–145)
SP GR UR STRIP: 1.02 (ref 1.02–1.02)
SQUAMOUS #/AREA URNS HPF: (no result) /HPF
VARIANT LYMPHS NFR BLD MANUAL: 12 %
WBC # BLD AUTO: 13 10^3/UL (ref 4.3–11)
WBC #/AREA URNS HPF: (no result) /HPF

## 2023-07-28 PROCEDURE — 88305 TISSUE EXAM BY PATHOLOGIST: CPT

## 2023-07-28 PROCEDURE — 74176 CT ABD & PELVIS W/O CONTRAST: CPT

## 2023-07-28 PROCEDURE — 96374 THER/PROPH/DIAG INJ IV PUSH: CPT

## 2023-07-28 PROCEDURE — 85025 COMPLETE CBC W/AUTO DIFF WBC: CPT

## 2023-07-28 PROCEDURE — 43239 EGD BIOPSY SINGLE/MULTIPLE: CPT

## 2023-07-28 PROCEDURE — 96361 HYDRATE IV INFUSION ADD-ON: CPT

## 2023-07-28 PROCEDURE — 80053 COMPREHEN METABOLIC PANEL: CPT

## 2023-07-28 PROCEDURE — 81000 URINALYSIS NONAUTO W/SCOPE: CPT

## 2023-07-28 PROCEDURE — 85027 COMPLETE CBC AUTOMATED: CPT

## 2023-07-28 PROCEDURE — 96376 TX/PRO/DX INJ SAME DRUG ADON: CPT

## 2023-07-28 PROCEDURE — 87081 CULTURE SCREEN ONLY: CPT

## 2023-07-28 PROCEDURE — 96375 TX/PRO/DX INJ NEW DRUG ADDON: CPT

## 2023-07-28 PROCEDURE — 83735 ASSAY OF MAGNESIUM: CPT

## 2023-07-28 PROCEDURE — 96366 THER/PROPH/DIAG IV INF ADDON: CPT

## 2023-07-28 PROCEDURE — 84100 ASSAY OF PHOSPHORUS: CPT

## 2023-07-28 PROCEDURE — 85007 BL SMEAR W/DIFF WBC COUNT: CPT

## 2023-07-28 PROCEDURE — 85730 THROMBOPLASTIN TIME PARTIAL: CPT

## 2023-07-28 PROCEDURE — 99284 EMERGENCY DEPT VISIT MOD MDM: CPT

## 2023-07-28 PROCEDURE — 94664 DEMO&/EVAL PT USE INHALER: CPT

## 2023-07-28 PROCEDURE — 85610 PROTHROMBIN TIME: CPT

## 2023-07-28 PROCEDURE — 36415 COLL VENOUS BLD VENIPUNCTURE: CPT

## 2023-07-28 RX ADMIN — DOCUSATE SODIUM SCH MG: 100 CAPSULE ORAL at 20:10

## 2023-07-28 RX ADMIN — SENNOSIDES SCH MG: 8.6 TABLET, FILM COATED ORAL at 20:10

## 2023-07-28 RX ADMIN — SODIUM CHLORIDE SCH MLS/HR: 900 INJECTION, SOLUTION INTRAVENOUS at 18:07

## 2023-07-28 NOTE — DIAGNOSTIC IMAGING REPORT
EXAMINATION: CT abdomen and pelvis without contrast.



TECHNIQUE: Multiple contiguous axial images were obtained through

the abdomen and pelvis without the use of intravenous contrast.

All CT scans use one or more of the following dose optimizing

techniques: automated exposure control, MA and/or KvP adjustment

based on patient size and exam type or iterative reconstruction. 



HISTORY: Left lower quadrant pain.



COMPARISON: None available.



FINDINGS: Limited views of the lower thorax show distention of

the esophagus with fluid. A pacemaker is present.



There is a cyst in the liver. No suspicious liver lesion. There

is no biliary ductal dilation. Gallbladder is normal. Pancreas is

normal. Spleen is normal. Adrenal glands are normal.



There are cysts in both kidneys. No suspicious renal lesion.

There is no hydronephrosis. Urinary bladder is normal.



There is a right inguinal hernia containing a loop of small

bowel. There is moderate dilation of the bowel walls upstream and

downstream from the hernia but the bowel is narrowed as it enters

and exits the hernia sac. No free fluid or air. No abdominal or

pelvic lymphadenopathy. Abdominal aorta measures 3.2 x 2.6 cm.



There is no suspicious osseus lesion.



IMPRESSION:

1. Bowel loops are moderately dilated. There is a right inguinal

hernia containing small bowel with narrowing of the bowel as it

enters and exits the hernia sac. Both upstream and downstream

bowel is dilated. The hernia may be incidental or may be

representing a partial obstruction.

2. The esophagus is distended with fluid. 



Dictated by: 



  Dictated on workstation # [id number]

## 2023-07-28 NOTE — HISTORY & PHYSICAL-HOSPITALIST
History of Present Illness


HPI/Chief Complaint


Chief complaint: GI bleed





HPI: This is an 88-year-old male of [facility] who presented to the ER with reports of 

coffee-ground emesis.  Patient reports if he lies down fluid comes out of his 

mouth.  Patient has remained stable with vital signs and BUN is elevated at 57 

consistent with blood in the gut.  Dr. Zeng has seen him and will perform EGD 

tomorrow.  CT scan shows fluid-filled stomach and duodenum. His wife of 67 years

is at the bedside.


Source:  patient


Exam Limitations:  no limitations


Date Seen


7/28/23


Time Seen by a Provider:  16:30


Attending Physician


[facility]


PCP


Admitting Physician:


 








Attending Physician:


Referring Physician





Date of Admission








Home Medications & Allergies


Home Medications


Reviewed patient Home Medication Reconciliation performed by pharmacy medication

reconciliations technician and/or nursing.


Patients Allergies have been reviewed.





Allergies





Allergies


Coded Allergies


  Penicillins (Unverified Allergy, Mild, 4/12/09)








Past Medical-Social-Family Hx


Patient Social History


Marrital Status:  


Employed/Student:  retired


Tobacco Use?:  Yes


Tobacco type used:  Cigarettes


Smoking Status:  Former Smoker


Substance use?:  No


Alcohol Use?:  No





Immunizations Up To Date


First/Initial COVID19 Vaccinat:  None


Second COVID19 Vaccination Erasmo:  None


Tetanus Booster (TDap):  Unknown





Current Status


Advance Directives:  No


Primary Language:  English


Preferred Spoken Language:  English





Past Medical History


Surgeries:  Coronary Stent, Pacemaker


Coronary Artery Disease, Hypertension


Renal Failure


Gastroesophageal Reflux, Chronic Diarrhea


Arthritis, Chronic Back Pain


Cataract


Hearing Impairment:  Hard of Hearing





PMHx:


Coronary artery disease


Hypertension





SurgHx:


Coronary artery stenting





Family Medical History


Heart Disease (siblings and parents), Cancer (Brother had Prostate CA), Diabetes

(siblings and parents), Hypertension





Review of Systems


Constitutional:  see HPI, malaise, weakness


Gastrointestinal:  abdominal pain, hematemesis, loss of appetite, nausea





Physical Exam


Physical Exam


Vital Signs





Vital Signs - First Documented








 7/28/23 7/28/23





 12:56 17:15


 


Temp 36.0 


 


Pulse 97 


 


Resp 16 


 


B/P (MAP) 151/72 (98) 


 


Pulse Ox 97 


 


O2 Delivery Room Air 


 


FiO2  21





Capillary Refill : Less Than 3 Seconds


Height, Weight, BMI


Height: '"


Weight: lbs. oz. kg; 22.00 BMI


Method:


General Appearance:  No Apparent Distress, Chronically ill


Eyes:  Right Eye Normal Inspection, Right Eye PERRL


HEENT:  PERRL/EOMI, Normal ENT Inspection, Pharynx Normal, Moist Mucous 

Membranes


Neck:  Full Range of Motion, Normal Inspection, Non Tender


Respiratory:  Chest Non Tender, Lungs Clear, Normal Breath Sounds, No Accessory 

Muscle Use, No Respiratory Distress


Cardiovascular:  Regular Rate, Rhythm, No Edema, No Gallop, No JVD, No Murmur, 

Normal Peripheral Pulses


Gastrointestinal:  Normal Bowel Sounds, No Organomegaly, No Pulsatile Mass, Non 

Tender, Soft


Back:  Normal Inspection, No CVA Tenderness, No Vertebral Tenderness


Extremity:  Normal Capillary Refill, Normal Inspection, Normal Range of Motion, 

Non Tender, No Calf Tenderness, No Pedal Edema


Neurologic/Psychiatric:  Alert, Oriented x3, No Motor/Sensory Deficits, Normal 

Mood/Affect


Skin:  Normal Color, Warm/Dry


Lymphatic:  No Adenopathy





Results


Results/Procedures


Labs


Laboratory Tests


7/28/23 13:03








Patient resulted labs reviewed.





Assessment/Plan


Admission Diagnosis


Assessment:


Hematemesis


Advanced age


Pacemaker


Coronary stent on aspirin


Oral anticoagulation








Plan:


ICU


Observation


EGD tomorrow


Appreciate Dr. Zeng


Admission Status:  Observation











MONIQUE COOPER DO                Jul 28, 2023 16:22

## 2023-07-28 NOTE — CONSULTATION - SURGERY
History of Present Illness


History of Present Illness


Patient Consulted On(papa/time)


7/28/23


 15:56


Time Seen by Provider:  15:06


History of Present Illness


Surgery asked to consult regarding abdominal pain and coffee ground emesis.





HPI per ED:  Patient is an 88-year-old male who presents to the emergency room 

with a chief complaint of left lower quadrant abdominal pain, diarrhea since 

early yesterday morning and drooling what looks like coffee-ground emesis type 

material today.  Wife states that all the symptoms started yesterday.  No recent

travel.  They have city water.  No recent antibiotic usage.  He has a history of

hypertension and takes daily baby aspirin.  He has had no prior abdominal 

surgeries.  Wife states he has never had occult colonoscopy that she is aware 

of.  They have been  67 years.  He denies any fevers or chills.  He 

thought that the coffee-ground type material in his mouth was due to possibly ha

ving swallowed some broken teeth back in May.  No intraoral injury is observed. 

He is not short of breath, not having any chest pain.  No lower extremity 

swelling.  Wife states that he has been very weak and not eating or drinking the

last 36 hours.  She denies any bright red blood in the stool.  He takes 

acetaminophen for pain. No NSAIDS. No recent pepto.  Is on an acid reducer.  Has

had "heartburn" type symptoms.  He did take 2 immodium tablets yesterday.





When I saw pt in the ER, he was laying in bed comfortable and did not appear to 

be in severe pain.  Wife states he hasn't eaten for 2 days, which is abnormal 

and pt piped up to say he wasn't hungry/didn't feel like eating.  Wife states 

that when he has diarrhea like this, he sometimes won't


eat for a day.....but this was too long.  She states it is usually when he eats 

"too much sugar" is when he has diarrhea.  She also reports that he had Hiatal 

hernia repair and when they did it he had "bad parts" and had to have a portion 

of his esophagus removed.  His last EGD was probably over 50 years ago.  His 

wife also states he has had an inguinal hernia for years and it has never bother

ed him (I asked because I saw it on CT).





Allergies and Home Medications


Allergies


Coded Allergies:  


     Penicillins (Unverified  Allergy, Mild, 4/12/09)





Patient Home Medication List


Home Medication List Reviewed:  Yes


Acetaminophen (Tylenol Extra Strength) 500 Mg Tablet, 1,000 MG PO HS, (Reported)


   Entered as Reported by: YULIA ROGEL on 12/20/21 0925


Aspirin (Aspirin EC) 81 Mg Tablet.dr, 81 MG PO DAILY, (Reported)


   Entered as Reported by: YULIA ROGEL on 12/20/21 0924


Ciprofloxacin HCl (Cipro) 500 Mg Tablet, 500 MG PO BID


   Prescribed by: JUAN MANUEL KU on 12/21/21 0802


Ciprofloxacin HCl (Ciprofloxacin HCl) 2.5 Ml Drops, 0 ML OP QID


   Prescribed by: MONIQUE COOPER on 12/21/21 1017


Famotidine (Acid Reducer (FAMOTIDINE)) 20 Mg Tablet, 20 MG PO DAILY, (Reported)


   Entered as Reported by: RYNE BIGGS RN on 12/18/21 1805


Furosemide (Furosemide) 20 Mg Tablet, 20 MG PO DAILY


   Prescribed by: JUAN MANUEL KU on 12/21/21 0802


Losartan Potassium (Losartan Potassium) 100 Mg Tablet, 100 MG PO 1800 BEFORE 

DINNER, (Reported)


   Entered as Reported by: RYNE BIGGS RN on 12/18/21 1805


Lovastatin (Lovastatin) 20 Mg Tablet, 20 MG PO HS, (Reported)


   Entered as Reported by: RYNE BIGGS RN on 12/18/21 1805


Metoprolol Succinate (Metoprolol Succinate) 100 Mg Tab.er.24h, 100 MG PO DAILY, 

(Reported)


   Entered as Reported by: RYNE BIGGS RN on 12/18/21 1805





Past Medical-Social-Family Hx


Patient Social History


Smoking Status:  Former Smoker


Alcohol Use?:  No





Surgeries


History of Surgeries:  Yes (Hiatal hernia)


Surgeries:  Pacemaker





Respiratory


History of Respiratory Disorde:  No





Cardiovascular


History of Cardiac Disorders:  Yes (congestive heart failure, hx of heart block)


Cardiac Disorders:  Coronary Artery Disease, Hypertension





Reproductive System


Hx Reproductive Disorders:  No





Genitourinary


History of Genitourinary Disor:  Yes


Genitourinary Disorders:  Renal Failure





Gastrointestinal


History of Gastrointestinal Di:  Yes


Gastrointestinal Disorders:  Gastroesophageal Reflux, Chronic Diarrhea





Musculoskeletal


History of Musculoskeletal Dis:  Yes


Musculoskeletal Disorders:  Arthritis, Chronic Back Pain





Endocrine


History of Endocrine Disorders:  No





HEENT


History of HEENT Disorders:  Yes


HEENT Disorders:  Cataract


Hearing Impairment:  Hard of Hearing





Cancer


History of Cancer:  No





Integumentary


History of Skin or Integumenta:  Yes (Raynauds)





Family Medical History


Significant Family History:  Heart Disease (siblings and parents), Cancer ([place] had Prostate CA), Diabetes (siblings and parents), Hypertension





Review of Systems-General


Constitutional:  No chills; malaise, weakness


EENTM:  hearing loss, other ( drooling); No epistaxis, No throat swelling


Respiratory:  No cough, No dyspnea on exertion, No hemoptysis


Cardiovascular:  No chest pain, No edema; Hx of Intervention


Gastrointestinal:  No abdominal pain; diarrhea; No nausea, No vomiting


Genitourinary:  dysuria; No hematuria; hesitancy


Musculoskeletal:  joint pain, joint swelling, muscle stiffness


Skin:  No change in color, No change in hair/nails


Psychiatric/Neurological:  Denies Anxiety, Denies Depressed, Denies Seizure; 

Tremors





Physical Exam-General Problems


Physical Exam


Vital Signs





Vital Signs - First Documented








 7/28/23





 12:56


 


Temp 36.0


 


Pulse 97


 


Resp 16


 


B/P (MAP) 151/72 (98)


 


Pulse Ox 97


 


O2 Delivery Room Air





Capillary Refill : Less Than 3 Seconds


General Appearance:  WD/WN, mild distress


Eyes:  Bilateral Eye PERRL, Bilateral Eye EOMI


HEENT:  pharynx normal; No scleral icterus (R), No scleral icterus (L)


Neck:  non-tender, supple


Respiratory:  lungs clear, normal breath sounds, no respiratory distress, no 

accessory muscle use


Cardiovascular:  regular rate, rhythm, no murmur


Gastrointestinal:  non tender, soft, no organomegaly; No distended; hernia 

(incarcerated right inguinal hernia, but feels like it goes down almost 

completely and then fills back up)


Rectal:  deferred


Back:  no CVA tenderness, no vertebral tenderness


Extremities:  no pedal edema, no calf tenderness, normal capillary refill


Neurologic/Psychiatric:  CNs II-XII nml as tested, alert, oriented x 3


Skin:  normal color, warm/dry, other (forearms and hands are purple, secondary 

to Raynauds)


Lymphatic:  no adenopathy (neck, axilla or groin)





Data Review


Labs


Laboratory Tests


7/28/23 13:03: 


White Blood Count 13.0H, Red Blood Count 4.83, Hemoglobin 15.5, Hematocrit 47, 

Mean Corpuscular Volume 96, Mean Corpuscular Hemoglobin 32, Mean Corpuscular 

Hemoglobin Concent 33, Red Cell Distribution Width 12.9, Platelet Count 160, 

Mean Platelet Volume 11.8, Immature Granulocyte % (Auto) 0, Neutrophils (%) 

(Auto) 78H, Lymphocytes (%) (Auto) 8L, Monocytes (%) (Auto) 13H, Eosinophils (%)

(Auto) 1, Basophils (%) (Auto) 0, Neutrophils # (Auto) 10.2H, Lymphocytes # 

(Auto) 1.0, Monocytes # (Auto) 1.6H, Eosinophils # (Auto) 0.1, Basophils # 

(Auto) 0.1, Immature Granulocyte # (Auto) 0.0, Neutrophils % (Manual) 74, 

Lymphocytes % (Manual) 12, Monocytes % (Manual) 10, Eosinophils % (Manual) 1, 

Basophils % (Manual) 0, Band Neutrophils 3, Blood Morphology Comment NORMAL, 

Sodium Level 137, Potassium Level 3.9, Chloride Level 105, Carbon Dioxide Level 

21, Anion Gap 11, Blood Urea Nitrogen 57H, Creatinine 1.38H, Estimat Glomerular 

Filtration Rate 49, BUN/Creatinine Ratio 41, Glucose Level 108H, Calcium Level 

9.8, Corrected Calcium 9.6, Total Bilirubin 1.2H, Aspartate Amino Transf 

(AST/SGOT) 24, Alanine Aminotransferase (ALT/SGPT) 18, Alkaline Phosphatase 67, 

Total Protein 7.6, Albumin 4.3


7/28/23 15:07: 


Urine Color YELLOW, Urine Clarity SL CLOUDY, Urine pH 6.0, Urine Specific 

Gravity 1.025H, Urine Protein 2+H, Urine Glucose (UA) NEGATIVE, Urine Ketones 

NEGATIVE, Urine Nitrite NEGATIVE, Urine Bilirubin NEGATIVE, Urine Urobilinogen 

0.2, Urine Leukocyte Esterase NEGATIVE, Urine RBC (Auto) 1+H, Urine RBC 0-2, 

Urine WBC NONE, Urine Squamous Epithelial Cells RARE, Urine Crystals NONE, Urine

Bacteria NEGATIVE, Urine Casts PRESENT, Urine Hyaline Casts 2-5H, Urine Mucus 

NEGATIVE, Urine Culture Indicated NO





Radiology


Date of Exam:07/28/23





CT ABDOMEN/PELVIS WO








EXAMINATION: CT abdomen and pelvis without contrast.





TECHNIQUE: Multiple contiguous axial images were obtained through


the abdomen and pelvis without the use of intravenous contrast.


All CT scans use one or more of the following dose optimizing


techniques: automated exposure control, MA and/or KvP adjustment


based on patient size and exam type or iterative reconstruction. 





HISTORY: Left lower quadrant pain.





COMPARISON: None available.





FINDINGS: Limited views of the lower thorax show distention of


the esophagus with fluid. A pacemaker is present.





There is a cyst in the liver. No suspicious liver lesion. There


is no biliary ductal dilation. Gallbladder is normal. Pancreas is


normal. Spleen is normal. Adrenal glands are normal.





There are cysts in both kidneys. No suspicious renal lesion.


There is no hydronephrosis. Urinary bladder is normal.





There is a right inguinal hernia containing a loop of small


bowel. There is moderate dilation of the bowel walls upstream and


downstream from the hernia but the bowel is narrowed as it enters


and exits the hernia sac. No free fluid or air. No abdominal or


pelvic lymphadenopathy. Abdominal aorta measures 3.2 x 2.6 cm.





There is no suspicious osseus lesion.





IMPRESSION:


1. Bowel loops are moderately dilated. There is a right inguinal


hernia containing small bowel with narrowing of the bowel as it


enters and exits the hernia sac. Both upstream and downstream


bowel is dilated. The hernia may be incidental or may be


representing a partial obstruction.


2. The esophagus is distended with fluid. 





  Dictated on workstation # [id number]








Dict:   07/28/23 [id number]


Trans:   07/28/23 1501


[facility] [phone]





Interpreted by:     BRAYAN MCKENZIE MD





Assessment/Plan


Diarrhea


Decreased appetite


??Coffee ground emesis


Renal Insufficiency


Right inguinal hernia - incarcerated


CAD


Heart block with Pacemaker insertion





I reviewed the CT myself before it was read by the Radiologist and then went 

over case with ER physician.  I am not as concerned about his hernia after 

talking to pt and his wife, but feel like he will probably benefit from EGD in 

the morning.  Actually, wife was asking/hoping that would


be done.  Will make him NPO after midnight and get consent.  Pt is being 

admitted for renal failure and IV fluids.  Will monitor his abdomen and he may 

need stool studies.  Pain control and anti-emetics as needed, can take home 

meds.











BLAINE MENDOZA DO               Jul 28, 2023 16:01

## 2023-07-28 NOTE — ED GI
General


Chief Complaint:  Abdominal/GI Problems


Stated Complaint:  DIARRHEA | SPITTING UP BLOOD


Nursing Triage Note:  


DIARRHEA X36 HOURS.  SPITTING UP DARK BROWN BLOOD STARTING YESTERDAY.


Source of Information:  Patient


Exam Limitations:  No Limitations





History of Present Illness


Date Seen by Provider:  2023


Time Seen by Provider:  13:12


Initial Comments


Patient is an 88-year-old male who presents to the emergency room with a chief 

complaint of left lower quadrant abdominal pain, diarrhea since early yesterday 

morning and drooling what looks like coffee-ground emesis type material today.  

Wife states that all the symptoms started yesterday.  No recent travel.  They 

have city water.  No recent antibiotic usage.  He has a history of hypertension 

and takes daily baby aspirin.  He has had no prior abdominal surgeries.  Wife 

states he has never had occult colonoscopy that she is aware of.  They have been

 67 years.  He denies any fevers or chills.  He thought that the coffee-

ground type material in his mouth was due to possibly having swallowed some 

broken teeth back in May.  No intraoral injury is observed.  He is not short of 

breath, not having any chest pain.  No lower extremity swelling.  Wife states 

that he has been very weak and not eating or drinking the last 36 hours.  She 

denies any bright red blood in the stool.


 


He takes acetaminophen for pain. No NSAIDS. No recent pepto.  Is on an acid 

reducer.  Has had "heartburn" type symptoms.  He did take 2 immodium tablets 

yesterday.


Timing/Duration:  1-2 Days


Severity/Quality:  Moderate ("5")





Allergies and Home Medications


Allergies


Coded Allergies:  


     Penicillins (Unverified  Allergy, Mild, 09)





Patient Home Medication List


Home Medication List Reviewed:  Yes


Acetaminophen (Tylenol Extra Strength) 500 Mg Tablet, 1,000 MG PO HS, (Reported)


   Entered as Reported by: YULIA ROGEL on 21


   Last Action: Continued


Aspirin (Aspirin EC) 81 Mg Tablet.dr, 81 MG PO DAILY, (Reported)


   Entered as Reported by: YULIA ROGEL on 21


   Last Action: Held


Famotidine (Acid Reducer (FAMOTIDINE)) 20 Mg Tablet, 20 MG PO DAILY, (Reported)


   Entered as Reported by: RYNE BIGGS RN on 21


   Last Action: Continued


Furosemide (Furosemide) 20 Mg Tablet, 20 MG PO DAILY


   Prescribed by: JUAN MANUEL KU on 21 0802


   Last Action: Held


Losartan Potassium (Losartan Potassium) 100 Mg Tablet, 100 MG PO 1800 BEFORE 

DINNER, (Reported)


   Entered as Reported by: RYNE BIGGS RN on 21


   Last Action: Held


Lovastatin (Lovastatin) 20 Mg Tablet, 20 MG PO HS, (Reported)


   Entered as Reported by: RYNE BIGGS RN on 21


   Last Action: Converted


Metoprolol Succinate (Metoprolol Succinate) 100 Mg Tab.er.24h, 100 MG PO DAILY, 

(Reported)


   Entered as Reported by: RYNE BIGGS RN on 21


   Last Action: Continued


Pantoprazole Sodium (Protonix) 40 Mg Tablet.dr, 40 MG PO BID


   Prescribed by: MONIQUE COOPER on 23


Rivaroxaban (Xarelto) 15 Mg Tablet, 15 MG PO DAILY, (Reported)


   Entered as Reported by: CANDICE NELSON on 23


   Last Action: Held


Sucralfate (Carafate) 1 Gram Tablet, 1 GM PO ACHS


   Prescribed by: MONIQUE COOPER on 23 1313





Review of Systems


Review of Systems


Constitutional:  weakness


EENTM:  No Symptoms Reported


Respiratory:  No Symptoms Reported


Cardiovascular:  No Symptoms Reported


Gastrointestinal:  Abdominal Pain, Diarrhea, Poor Appetite


Genitourinary:  No Symptoms Reported


Musculoskeletal:  no symptoms reported


Skin:  no symptoms reported


Psychiatric/Neurological:  No Symptoms Reported





All Other Systems Reviewed


Negative Unless Noted:  Yes





Past Medical-Social-Family Hx


Patient Social History


Tobacco Use?:  Yes


Tobacco type used:  Cigarettes


Smoking Status:  Former Smoker


Substance use?:  No


Alcohol Use?:  No





Immunizations Up To Date


First/Initial COVID19 Vaccinat:  None


Second COVID19 Vaccination Erasmo:  None


Third COVID19 Vaccination Date:  None





Past Medical History


Reproductive Disorders:  No





Physical Exam


Vital Signs





Vital Signs - First Documented








 23





 12:56


 


Temp 36.0


 


Pulse 97


 


Resp 16


 


B/P (MAP) 151/72 (98)


 


Pulse Ox 97


 


O2 Delivery Room Air





Capillary Refill : Less Than 3 Seconds


Height/Weight/BMI


Height: '"


Weight: lbs. oz. kg; 22.00 BMI


Method:


General Appearance:  WD/WN, no apparent distress


HEENT:  PERRL/EOMI


Neck:  full range of motion, normal inspection


Respiratory:  lungs clear, normal breath sounds, no respiratory distress, no 

accessory muscle use, other (pacemaker left chest )


Cardiovascular:  regular rate, rhythm


Gastrointestinal:  soft, abnormal bowel sounds (hypoactive BS); No distended, No

guarding; other (mild tenderness Left Lower quadrant; right inguinal hernia - 

soft and reducible)


Extremities:  normal range of motion, non-tender, normal inspection


Neurologic/Psychiatric:  alert, normal mood/affect, oriented x 3, other (hard of

hearing)


Skin:  normal color, warm/dry





Progress/Results/Core Measures


Results/Orders


Lab Results





Laboratory Tests








Test


 23


13:03 23


15:07 Range/Units


 


 


White Blood Count


 13.0 H


 


 4.3-11.0


10^3/uL


 


Red Blood Count


 4.83 


 


 4.30-5.52


10^6/uL


 


Hemoglobin 15.5   13.3-17.7  g/dL


 


Hematocrit 47   40-54  %


 


Mean Corpuscular Volume 96   80-99  fL


 


Mean Corpuscular Hemoglobin 32   25-34  pg


 


Mean Corpuscular Hemoglobin


Concent 33 


 


 32-36  g/dL





 


Red Cell Distribution Width 12.9   10.0-14.5  %


 


Platelet Count


 160 


 


 130-400


10^3/uL


 


Mean Platelet Volume 11.8   9.0-12.2  fL


 


Immature Granulocyte % (Auto) 0    %


 


Neutrophils (%) (Auto) 78 H  42-75  %


 


Lymphocytes (%) (Auto) 8 L  12-44  %


 


Monocytes (%) (Auto) 13 H  0-12  %


 


Eosinophils (%) (Auto) 1   0-10  %


 


Basophils (%) (Auto) 0   0-10  %


 


Neutrophils # (Auto)


 10.2 H


 


 1.8-7.8


10^3/uL


 


Lymphocytes # (Auto)


 1.0 


 


 1.0-4.0


10^3/uL


 


Monocytes # (Auto)


 1.6 H


 


 0.0-1.0


10^3/uL


 


Eosinophils # (Auto)


 0.1 


 


 0.0-0.3


10^3/uL


 


Basophils # (Auto)


 0.1 


 


 0.0-0.1


10^3/uL


 


Immature Granulocyte # (Auto)


 0.0 


 


 0.0-0.1


10^3/uL


 


Neutrophils % (Manual) 74    %


 


Lymphocytes % (Manual) 12    %


 


Monocytes % (Manual) 10    %


 


Eosinophils % (Manual) 1    %


 


Basophils % (Manual) 0    %


 


Band Neutrophils 3    %


 


Blood Morphology Comment NORMAL    


 


Sodium Level 137   135-145  MMOL/L


 


Potassium Level 3.9   3.6-5.0  MMOL/L


 


Chloride Level 105     MMOL/L


 


Carbon Dioxide Level 21   21-32  MMOL/L


 


Anion Gap 11   5-14  MMOL/L


 


Blood Urea Nitrogen 57 H  7-18  MG/DL


 


Creatinine


 1.38 H


 


 0.60-1.30


MG/DL


 


Estimat Glomerular Filtration


Rate 49 


 


  





 


BUN/Creatinine Ratio 41    


 


Glucose Level 108 H    MG/DL


 


Calcium Level 9.8   8.5-10.1  MG/DL


 


Corrected Calcium 9.6   8.5-10.1  MG/DL


 


Total Bilirubin 1.2 H  0.1-1.0  MG/DL


 


Aspartate Amino Transf


(AST/SGOT) 24 


 


 5-34  U/L





 


Alanine Aminotransferase


(ALT/SGPT) 18 


 


 0-55  U/L





 


Alkaline Phosphatase 67     U/L


 


Total Protein 7.6   6.4-8.2  GM/DL


 


Albumin 4.3   3.2-4.5  GM/DL


 


Urine Color  YELLOW   


 


Urine Clarity  SL CLOUDY   


 


Urine pH  6.0  5-9  


 


Urine Specific Gravity  1.025 H 1.016-1.022  


 


Urine Protein  2+ H NEGATIVE  


 


Urine Glucose (UA)  NEGATIVE  NEGATIVE  


 


Urine Ketones  NEGATIVE  NEGATIVE  


 


Urine Nitrite  NEGATIVE  NEGATIVE  


 


Urine Bilirubin  NEGATIVE  NEGATIVE  


 


Urine Urobilinogen  0.2  < = 1.0  MG/DL


 


Urine Leukocyte Esterase  NEGATIVE  NEGATIVE  


 


Urine RBC (Auto)  1+ H NEGATIVE  


 


Urine RBC  0-2   /HPF


 


Urine WBC  NONE   /HPF


 


Urine Squamous Epithelial


Cells 


 RARE 


  /HPF





 


Urine Crystals  NONE   /LPF


 


Urine Bacteria  NEGATIVE   /HPF


 


Urine Casts  PRESENT   /LPF


 


Urine Hyaline Casts  2-5 H  /LPF


 


Urine Mucus  NEGATIVE   /LPF


 


Urine Culture Indicated  NO   








My Orders





Orders - ADITI HURTADO MD


Ed Iv/Invasive Line Start (23 13:32)


Cbc With Automated Diff (23 13:32)


Comprehensive Metabolic Panel (23 13:32)


Ua Culture If Indicated (23 13:32)


Manual Differential (23 13:03)


Ct Abdomen/Pelvis Wo (23 13:57)


Pantoprazole Injection (Protonix Injecti (23 14:00)


Ns Iv 1000 Ml (Sodium Chloride 0.9%) (23 13:57)





Medications Given in ED





Current Medications








 Medications  Dose


 Ordered  Sig/Mickie


 Route  Start Time


 Stop Time Status Last Admin


Dose Admin


 


 Pantoprazole  40 mg  ONCE  ONCE


 IV  23 14:00


 23 14:01 DC 23 14:09


40 MG








Vital Signs/I&O











 23





 12:56 14:10


 


Temp 36.0 


 


Pulse 97 84


 


Resp 16 16


 


B/P (MAP) 151/72 (98) 141/81 (101)


 


Pulse Ox 97 98


 


O2 Delivery Room Air Room Air














Blood Pressure Mean:                    98











Progress


Progress Note :  


   Time:  15:34


   Progress Note


Patient seen and evaluated by me.  Elderly male in no acute distress.  

Evaluation today include CBC, Chem-12, coagulation profile, urinalysis as well 

as CT of the abdomen and pelvis with out IV contrast.  Pertinent physical exam 

findings thin elderly male with stable vital signs.  Heart is regular, lungs are

clear.  He has a soft mildly tender abdomen in the left lower quadrant.  He has 

a large soft reducible right inguinal hernia.  No focal neurologic deficits.





Differential diagnosis based on history and physical exam, partial small bowel 

obstruction, ulcer/bleeding





Labs independently reviewed and interpreted by me.  His CBC shows a mildly 

elevated white blood cell count at 13,000 with a hemoglobin of 15.5 hematocrit 

11 platelet count of 160.  78% segmented neutrophils.  Chem-12 remarkable only 

for increased BUN at 50 creatinine at 1.38.  Coags are within normal limits, 

urinalysis is negative.  CT scan abdomen and pelvis noncontrast read by the 

radiologist reveals dilated and distended esophagus that is fluid-filled as well

as the right-sided inguinal hernia with both proximal and distal duct bowel 

loops.  Patient is treated in the emergency department with 40mg Protonix IV and

a liter of normal saline.  Case was discussed with Dr. Cooper, hospitalist on-

call for admission with consultation to Dr Zeng for the hematemesis.





Diagnostic Imaging





   Diagonstic Imaging:  Xray


   Comments


                 [facility] [place], [place]





NAME:   LIZZIE CURRY


[facility] REC#:   [id number]


ACCOUNT#:   [id number]


PT STATUS:   REG ER


:   1935


PHYSICIAN:   ADITI HURTADO MD


ADMIT DATE:   23/ER


                                   ***Draft***


Date of Exam:23





CT ABDOMEN/PELVIS WO








EXAMINATION: CT abdomen and pelvis without contrast.





TECHNIQUE: Multiple contiguous axial images were obtained through


the abdomen and pelvis without the use of intravenous contrast.


All CT scans use one or more of the following dose optimizing


techniques: automated exposure control, MA and/or KvP adjustment


based on patient size and exam type or iterative reconstruction. 





HISTORY: Left lower quadrant pain.





COMPARISON: None available.





FINDINGS: Limited views of the lower thorax show distention of


the esophagus with fluid. A pacemaker is present.





There is a cyst in the liver. No suspicious liver lesion. There


is no biliary ductal dilation. Gallbladder is normal. Pancreas is


normal. Spleen is normal. Adrenal glands are normal.





There are cysts in both kidneys. No suspicious renal lesion.


There is no hydronephrosis. Urinary bladder is normal.





There is a right inguinal hernia containing a loop of small


bowel. There is moderate dilation of the bowel walls upstream and


downstream from the hernia but the bowel is narrowed as it enters


and exits the hernia sac. No free fluid or air. No abdominal or


pelvic lymphadenopathy. Abdominal aorta measures 3.2 x 2.6 cm.





There is no suspicious osseus lesion.





IMPRESSION:


1. Bowel loops are moderately dilated. There is a right inguinal


hernia containing small bowel with narrowing of the bowel as it


enters and exits the hernia sac. Both upstream and downstream


bowel is dilated. The hernia may be incidental or may be


representing a partial obstruction.


2. The esophagus is distended with fluid. 





  Dictated on workstation # [id number]








Dict:   23 1447


Trans:   23 1501


[facility] [id number]





Interpreted by:     BRAYAN MCKENZIE MD


Electronically signed by:





Departure


Communication (Admissions)


Time/Spoke to Admitting Phy:  15:46


Discussed with Dr Cooper [facility] hospitalist - admit obs


Time/Spoke to Consulting Phy:  15:25


discussed with Dr Zeng





Impression





   Primary Impression:  


   Abdominal pain


   Qualified Codes:  R10.32 - Left lower quadrant pain


   Additional Impressions:  


   Diarrhea in adult patient


   Dehydration


Disposition:   ADMITTED AS INPATIENT


Condition:  Stable





Admissions


Decision to Admit Reason:  Admit from ER (General)


Decision to Admit/Date:  2023


Time/Decision to Admit Time:  15:32





Departure-Patient Inst.


Referrals:  


[facility] (PCP/Family)


Primary Care Physician


Scripts


Sucralfate (Carafate) 1 Gram Tablet


1 GM PO ACHS, #120 TAB


   Prov: MONIQUE COOPER DO         23 


Pantoprazole Sodium (Protonix) 40 Mg Tablet.dr


40 MG PO BID, #60 TAB


   Prov: MONIQUE COOPER DO         23











ADITI HURTADO MD         2023 13:13

## 2023-07-29 VITALS — SYSTOLIC BLOOD PRESSURE: 124 MMHG | DIASTOLIC BLOOD PRESSURE: 61 MMHG

## 2023-07-29 VITALS — SYSTOLIC BLOOD PRESSURE: 124 MMHG | DIASTOLIC BLOOD PRESSURE: 66 MMHG

## 2023-07-29 LAB
ALBUMIN SERPL-MCNC: 3.1 GM/DL (ref 3.2–4.5)
ALP SERPL-CCNC: 51 U/L (ref 40–136)
ALT SERPL-CCNC: 13 U/L (ref 0–55)
APTT BLD: 31 SEC (ref 24–35)
BASOPHILS # BLD AUTO: 0.1 10^3/UL (ref 0–0.1)
BASOPHILS NFR BLD AUTO: 1 % (ref 0–10)
BILIRUB SERPL-MCNC: 1 MG/DL (ref 0.1–1)
BUN/CREAT SERPL: 40
CALCIUM SERPL-MCNC: 8.4 MG/DL (ref 8.5–10.1)
CHLORIDE SERPL-SCNC: 112 MMOL/L (ref 98–107)
CO2 SERPL-SCNC: 19 MMOL/L (ref 21–32)
CREAT SERPL-MCNC: 0.86 MG/DL (ref 0.6–1.3)
EOSINOPHIL # BLD AUTO: 0.2 10^3/UL (ref 0–0.3)
EOSINOPHIL NFR BLD AUTO: 2 % (ref 0–10)
GFR SERPLBLD BASED ON 1.73 SQ M-ARVRAT: 83 ML/MIN
GLUCOSE SERPL-MCNC: 91 MG/DL (ref 70–105)
HCT VFR BLD CALC: 38 % (ref 40–54)
HGB BLD-MCNC: 12.9 G/DL (ref 13.3–17.7)
INR PPP: 1.1 (ref 0.8–1.4)
LYMPHOCYTES # BLD AUTO: 0.7 10^3/UL (ref 1–4)
LYMPHOCYTES NFR BLD AUTO: 8 % (ref 12–44)
MAGNESIUM SERPL-MCNC: 1.7 MG/DL (ref 1.6–2.4)
MANUAL DIFFERENTIAL PERFORMED BLD QL: NO
MCH RBC QN AUTO: 32 PG (ref 25–34)
MCHC RBC AUTO-ENTMCNC: 34 G/DL (ref 32–36)
MCV RBC AUTO: 95 FL (ref 80–99)
MONOCYTES # BLD AUTO: 1.2 10^3/UL (ref 0–1)
MONOCYTES NFR BLD AUTO: 13 % (ref 0–12)
NEUTROPHILS # BLD AUTO: 6.8 10^3/UL (ref 1.8–7.8)
NEUTROPHILS NFR BLD AUTO: 75 % (ref 42–75)
PHOSPHATE SERPL-MCNC: 2 MG/DL (ref 2.3–4.7)
PLATELET # BLD: 115 10^3/UL (ref 130–400)
PMV BLD AUTO: 11.8 FL (ref 9–12.2)
POTASSIUM SERPL-SCNC: 4 MMOL/L (ref 3.6–5)
PROT SERPL-MCNC: 5.5 GM/DL (ref 6.4–8.2)
PROTHROMBIN TIME: 14 SEC (ref 12.2–14.7)
SODIUM SERPL-SCNC: 138 MMOL/L (ref 135–145)
WBC # BLD AUTO: 9.1 10^3/UL (ref 4.3–11)

## 2023-07-29 RX ADMIN — SODIUM CHLORIDE SCH MLS/HR: 900 INJECTION, SOLUTION INTRAVENOUS at 08:07

## 2023-07-29 RX ADMIN — DOCUSATE SODIUM SCH MG: 100 CAPSULE ORAL at 07:43

## 2023-07-29 RX ADMIN — MAGNESIUM SULFATE IN DEXTROSE SCH MLS/HR: 10 INJECTION, SOLUTION INTRAVENOUS at 09:52

## 2023-07-29 RX ADMIN — MAGNESIUM SULFATE IN DEXTROSE SCH MLS/HR: 10 INJECTION, SOLUTION INTRAVENOUS at 06:16

## 2023-07-29 RX ADMIN — MAGNESIUM SULFATE IN DEXTROSE SCH MLS/HR: 10 INJECTION, SOLUTION INTRAVENOUS at 08:00

## 2023-07-29 RX ADMIN — SENNOSIDES SCH MG: 8.6 TABLET, FILM COATED ORAL at 07:44

## 2023-07-29 RX ADMIN — MAGNESIUM SULFATE IN DEXTROSE SCH MLS/HR: 10 INJECTION, SOLUTION INTRAVENOUS at 08:09

## 2023-07-29 NOTE — PROGRESS NOTE - SURGERY
Subjective


Time Seen by a Provider:  11:59


Subjective/Events-last exam


Pt seen and examined, states he has some minimal abdominal pain.  Nurse states 

no coffee ground emesis over night.


Review of Systems


General:  No Chills, No Night Sweats


Pulmonary:  No Dyspnea, No Cough


Cardiovascular:  No: Chest Pain, Palpitations


Gastrointestinal:  Abdominal Pain; No: Nausea, Vomiting





Objective


Exam





Vital Signs








  Date Time  Temp Pulse Resp B/P (MAP) Pulse Ox O2 Delivery O2 Flow Rate FiO2


 


7/29/23 11:36 36.4       


 


7/29/23 11:00  82 10 140/70 (99) 93 Room Air  


 


7/29/23 10:00  71 17 132/69 (95) 90 Room Air  


 


7/29/23 09:00  70 17 136/64 (94)  Room Air  


 


7/29/23 08:10     100 Room Air  


 


7/29/23 08:00  71 19 135/66 (89) 91 Room Air  


 


7/29/23 07:58 36.3       


 


7/29/23 07:00  86 21 120/89 (91) 92 Room Air  


 


7/29/23 07:00  80      


 


7/29/23 06:00  68 16 134/68 (90) 97 Room Air  


 


7/29/23 05:00  71 13 131/63 (85) 94 Room Air  


 


7/29/23 04:00     91 Room Air  


 


7/29/23 04:00  71 15 101/62 (75) 94 Room Air  


 


7/29/23 03:00  75 18 126/68 (87) 96 Room Air  


 


7/29/23 02:00  73 16 104/72 (83) 93 Room Air  


 


7/29/23 01:00  71      


 


7/29/23 01:00  71 15 118/66 (83) 94 Room Air  


 


7/29/23 00:00  73 20 123/61 (81) 96 Room Air  


 


7/28/23 23:59     91 Room Air  


 


7/28/23 23:00  71 16 120/58 (78) 94 Room Air  


 


7/28/23 22:00  76 19 127/58 (81) 96 Room Air  


 


7/28/23 21:58     97 Nasal Cannula 2.00 


 


7/28/23 21:56      Room Air  


 


7/28/23 21:00  82 23 127/72 (90)  Room Air  


 


7/28/23 20:43  80      


 


7/28/23 20:00     94 Room Air  


 


7/28/23 20:00  82 21 124/67 (86) 94 Room Air  


 


7/28/23 19:00  83 24 123/74 (90) 97 Room Air  


 


7/28/23 19:00  90      


 


7/28/23 18:00  90 15 139/79 (99)  Room Air  


 


7/28/23 17:18     98 Room Air  


 


7/28/23 17:15 36.0 84   98   21


 


7/28/23 17:00      Room Air  


 


7/28/23 17:00  87 15 156/84 (108)  Room Air  


 


7/28/23 17:00  90      


 


7/28/23 16:30 36.4 68 16 134/82 97 Room Air  


 


7/28/23 14:10  84 16 141/81 (101) 98 Room Air  


 


7/28/23 12:56 36.0 97 16 151/72 (98) 97 Room Air  














I & O 


 


 7/29/23





 07:00


 


Intake Total 2050 ml


 


Output Total 350 ml


 


Balance 1700 ml





Capillary Refill : Less Than 3 Seconds


General Appearance:  No Apparent Distress, Chronically ill


HEENT:  Moist Mucous Membranes


Respiratory:  Lungs Clear, Normal Breath Sounds, No Accessory Muscle Use, No 

Respiratory Distress


Cardiovascular:  Regular Rate, Rhythm, No Murmur


Gastrointestinal:  soft, no organomegaly; No distended; tenderness (with deep 

palpation), hernia (incarcerated right inguinal hernia, but feels like it goes 

down almost completely and then fills back up.  Tender when pushed)


Extremity:  No Pedal Edema





Results


Lab


Laboratory Tests


7/28/23 13:03: 


White Blood Count 13.0H, Red Blood Count 4.83, Hemoglobin 15.5, Hematocrit 47, 

Mean Corpuscular Volume 96, Mean Corpuscular Hemoglobin 32, Mean Corpuscular 

Hemoglobin Concent 33, Red Cell Distribution Width 12.9, Platelet Count 160, 

Mean Platelet Volume 11.8, Immature Granulocyte % (Auto) 0, Neutrophils (%) 

(Auto) 78H, Lymphocytes (%) (Auto) 8L, Monocytes (%) (Auto) 13H, Eosinophils (%)

(Auto) 1, Basophils (%) (Auto) 0, Neutrophils # (Auto) 10.2H, Lymphocytes # 

(Auto) 1.0, Monocytes # (Auto) 1.6H, Eosinophils # (Auto) 0.1, Basophils # 

(Auto) 0.1, Immature Granulocyte # (Auto) 0.0, Neutrophils % (Manual) 74, 

Lymphocytes % (Manual) 12, Monocytes % (Manual) 10, Eosinophils % (Manual) 1, 

Basophils % (Manual) 0, Band Neutrophils 3, Blood Morphology Comment NORMAL, 

Sodium Level 137, Potassium Level 3.9, Chloride Level 105, Carbon Dioxide Level 

21, Anion Gap 11, Blood Urea Nitrogen 57H, Creatinine 1.38H, Estimat Glomerular 

Filtration Rate 49, BUN/Creatinine Ratio 41, Glucose Level 108H, Calcium Level 

9.8, Corrected Calcium 9.6, Total Bilirubin 1.2H, Aspartate Amino Transf 

(AST/SGOT) 24, Alanine Aminotransferase (ALT/SGPT) 18, Alkaline Phosphatase 67, 

Total Protein 7.6, Albumin 4.3


7/28/23 15:07: 


Urine Color YELLOW, Urine Clarity SL CLOUDY, Urine pH 6.0, Urine Specific 

Gravity 1.025H, Urine Protein 2+H, Urine Glucose (UA) NEGATIVE, Urine Ketones 

NEGATIVE, Urine Nitrite NEGATIVE, Urine Bilirubin NEGATIVE, Urine Urobilinogen 

0.2, Urine Leukocyte Esterase NEGATIVE, Urine RBC (Auto) 1+H, Urine RBC 0-2, 

Urine WBC NONE, Urine Squamous Epithelial Cells RARE, Urine Crystals NONE, Urine

Bacteria NEGATIVE, Urine Casts PRESENT, Urine Hyaline Casts 2-5H, Urine Mucus 

NEGATIVE, Urine Culture Indicated NO


7/29/23 04:35: 


White Blood Count 9.1, Red Blood Count 3.99L, Hemoglobin 12.9L, Hematocrit 38L, 

Mean Corpuscular Volume 95, Mean Corpuscular Hemoglobin 32, Mean Corpuscular 

Hemoglobin Concent 34, Red Cell Distribution Width 12.8, Platelet Count 115L, 

Mean Platelet Volume 11.8, Immature Granulocyte % (Auto) 0, Neutrophils (%) 

(Auto) 75, Lymphocytes (%) (Auto) 8L, Monocytes (%) (Auto) 13H, Eosinophils (%) 

(Auto) 2, Basophils (%) (Auto) 1, Neutrophils # (Auto) 6.8, Lymphocytes # (Auto)

0.7L, Monocytes # (Auto) 1.2H, Eosinophils # (Auto) 0.2, Basophils # (Auto) 0.1,

Immature Granulocyte # (Auto) 0.0, Sodium Level 138, Potassium Level 4.0, 

Chloride Level 112H, Carbon Dioxide Level 19L, Anion Gap 7, Blood Urea Nitrogen 

34H, Creatinine 0.86, Estimat Glomerular Filtration Rate 83, BUN/Creatinine 

Ratio 40, Glucose Level 91, Calcium Level 8.4L, Corrected Calcium 9.1, Total 

Bilirubin 1.0, Aspartate Amino Transf (AST/SGOT) 21, Alanine Aminotransferase 

(ALT/SGPT) 13, Alkaline Phosphatase 51, Total Protein 5.5L, Albumin 3.1L, 

Percent Immature Platelet Fraction 5.7, Phosphorus Level 2.0L, Magnesium Level 

1.7


7/29/23 04:40: 


Prothrombin Time 14.0, INR Comment 1.1, Activated Partial Thromboplast Time 31





Assessment/Plan


Diarrhea


Decreased appetite


??Coffee ground emesis


Renal Insufficiency


Right inguinal hernia - incarcerated


CAD


Heart block with Pacemaker insertion





Plan for EGD today, will monitor his abdomen and he may need stool studies.  

Pain control and anti-emetics as needed, can take home meds.





Clinical Quality Measures


DVT/VTE Risk/Contraindication:


Contraindications-Pharm:  Other *list below*


Other:  


BLAINE ABEL DO               Jul 29, 2023 12:18

## 2023-07-29 NOTE — ENDOSCOPY DISCHARGE INSTRUCT
Endo Procedure/Findings


Findings


1.:  Duodenal Ulcer


2.:  Hiatal Hernia





Discharge Instructions


-


Activity: You might feel a little sleepy until tomorrow.  This is due to the 

medicine you received to relax you.





Until tomorrow, you should:  


   NOT drive a car, operate machinery or power tools.


   NOT drink any alcoholic beverages.


   NOT make any important decisions or sign importortant papers.





Do not return to work until tomorrow, unless otherwise instructed. Resume 

previous activities tomorrow.





Diet: Start by taking liquids.  If you tolerate liquids, advance to solid food.


1.:  EGD in 1 year





Notify Physician


-


If you experience excessive bleeding, unusual abdominal pain, fever, or chest 

pain, contact your doctor immediately.





Follow-Up:


Other Follow up


in my office in next 7-10 days, could even be this Thursday.  Call for 

appointment [phone]











BLAINE MENDOZA DO               Jul 29, 2023 13:28

## 2023-07-29 NOTE — PROGRESS NOTE - HOSPITALIST
Subjective


HPI/CC On Admission


Date Seen by Provider:  Jul 29, 2023


Time Seen by Provider:  11:00


Chief complaint: GI bleed





HPI: This is an 88-year-old male of [facility] who presented to the ER with reports of 

coffee-ground emesis.  Patient reports if he lies down fluid comes out of his 

mouth.  Patient has remained stable with vital signs and BUN is elevated at 57 

consistent with blood in the gut.  Dr. Zeng has seen him and will perform EGD 

tomorrow.  CT scan shows fluid-filled stomach and duodenum. His wife of 67 years

is at the bedside.





Objective


Exam


Vital Signs





Vital Signs








  Date Time  Temp Pulse Resp B/P (MAP) Pulse Ox O2 Delivery O2 Flow Rate FiO2


 


7/29/23 12:30  87      


 


7/29/23 12:00     95 Room Air  


 


7/29/23 11:36 36.4       


 


7/29/23 11:00   10 140/70 (99)    


 


7/28/23 21:58       2.00 


 


7/28/23 17:15        21





Capillary Refill : Less Than 3 Seconds





Results/Procedures


Lab


Laboratory Tests


7/29/23 04:35








Patient resulted labs reviewed.





Clinical Quality Measures


DVT/VTE Risk/Contraindication:


Contraindications-Pharm:  Other *list below*


Other:  


MONIQUE RAMACHANDRAN DO                Jul 29, 2023 06:46

## 2023-07-29 NOTE — TELE-ICU CONSULT
History of Present Illness


History of Present Illness


Date Seen by Provider:  Jul 29, 2023


Time Seen by Provider:  07:44


History of Present Illness











eICU critical care consult





89 yo M admitted with coffee grounds/UGI bleed, Hb  15.5, now 12.9, BP has been 

ok, no CP or SOB


on IV PPI and po H2 blocker


CT abd shows umbilical hernia with small bowel inside, may be partial 

obstruction, also esophagus is has fluid, small bowel has large amount of air, 

since in MICU not vomiting or having regurgitation., 


Hx of hiatal hernia and may have had surgery on esophagus, to have EGD today 








PMH


CAD with stent-has been on ASA, PPM


HTN


CKD


GERD


chronic diarrhea-not having diarrhea but had it at home





Allergies and Home Medications


Allergies


Coded Allergies:  


     Penicillins (Unverified  Allergy, Mild, 4/12/09)





Home Medications


Acetaminophen 500 Mg Tablet, 1,000 MG PO HS, (Reported)


Aspirin 81 Mg Tablet.dr, 81 MG PO DAILY, (Reported)


Famotidine 20 Mg Tablet, 20 MG PO DAILY, (Reported)


Furosemide 20 Mg Tablet, 20 MG PO DAILY


   Prescribed by: JUAN MANUEL KU on 12/21/21 0802


Losartan Potassium 100 Mg Tablet, 100 MG PO 1800 BEFORE DINNER, (Reported)


Lovastatin 20 Mg Tablet, 20 MG PO HS, (Reported)


Metoprolol Succinate 100 Mg Tab.er.24h, 100 MG PO DAILY, (Reported)


Rivaroxaban 15 Mg Tablet, 15 MG PO DAILY, (Reported)





Past Medical/Social/Family Hx


Patient Social History


Marrital Status:  


Employed/Student:  retired


Tobacco Use?:  No


Tobacco type used:  Cigarettes


Smoking Status:  Never a Smoker


Use of E-Cig and/or Vaping dev:  No


Substance use?:  No


Alcohol Use?:  No


Pt stated abuse/neglect:  No





Immunizations Up To Date


First/Initial COVID19 Vaccinat:  None


Second COVID19 Vaccination Erasmo:  None


Tetanus Booster (TDap):  Unknown





Current Status


Advance Directives:  No


Communicates:  Verbally


Primary Language:  English


Preferred Spoken Language:  English





Past Medical History





PMHx:


Coronary artery disease


Hypertension





SurgHx:


Coronary artery stenting





Review of Systems


Constitutional:  see HPI


EENTM:  see HPI


Respiratory:  see HPI


Cardiovascular:  see HPI


Gastrointestinal:  see HPI


Genitourinary:  see HPI


Musculoskeletal:  see HPI


Skin:  see HPI


Psychiatric/Neurological:  See HPI





Focused Exam


Height, Weight, BMI


Height: '"


Weight: lbs. oz. kg; 22.46 BMI


Method:





Exam


Exam


Patient acknowledged, consented, and participated in this virtual visit which 

was conducted using real time audio/video


Vital Signs








  Date Time  Temp Pulse Resp B/P (MAP) Pulse Ox O2 Delivery O2 Flow Rate FiO2


 


7/29/23 07:00  80      


 


7/29/23 06:00  68 16 134/68 (90) 97 Room Air  


 


7/29/23 05:00  71 13 131/63 (85) 94 Room Air  


 


7/29/23 04:00     91 Room Air  


 


7/29/23 04:00  71 15 101/62 (75) 94 Room Air  


 


7/29/23 03:00  75 18 126/68 (87) 96 Room Air  


 


7/29/23 02:00  73 16 104/72 (83) 93 Room Air  


 


7/29/23 01:00  71      


 


7/29/23 01:00  71 15 118/66 (83) 94 Room Air  


 


7/29/23 00:00  73 20 123/61 (81) 96 Room Air  


 


7/28/23 23:59     91 Room Air  


 


7/28/23 23:00  71 16 120/58 (78) 94 Room Air  


 


7/28/23 22:00  76 19 127/58 (81) 96 Room Air  


 


7/28/23 21:58     97 Nasal Cannula 2.00 


 


7/28/23 21:56      Room Air  


 


7/28/23 21:00  82 23 127/72 (90)  Room Air  


 


7/28/23 20:43  80      


 


7/28/23 20:00     94 Room Air  


 


7/28/23 20:00  82 21 124/67 (86) 94 Room Air  


 


7/28/23 19:00  83 24 123/74 (90) 97 Room Air  


 


7/28/23 19:00  90      


 


7/28/23 18:00  90 15 139/79 (99)  Room Air  


 


7/28/23 17:18     98 Room Air  


 


7/28/23 17:15 36.0 84   98   21


 


7/28/23 17:00      Room Air  


 


7/28/23 17:00  87 15 156/84 (108)  Room Air  


 


7/28/23 17:00  90      


 


7/28/23 16:30 36.4 68 16 134/82 97 Room Air  


 


7/28/23 14:10  84 16 141/81 (101) 98 Room Air  


 


7/28/23 12:56 36.0 97 16 151/72 (98) 97 Room Air  














I & O 


 


 7/29/23





 07:00


 


Intake Total 2050 ml


 


Output Total 350 ml


 


Balance 1700 ml








Height & Weight


Height: '"


Weight: lbs. oz. kg; 22.46 BMI


Method:


General Appearance:  No Apparent Distress, Chronically ill


HEENT:  PERRL/EOMI, Normal ENT Inspection, Pharynx Normal, Moist Mucous 

Membranes


Neck:  Full Range of Motion, Normal Inspection, Non Tender


Respiratory:  Chest Non Tender, Lungs Clear, Normal Breath Sounds, No Accessory 

Muscle Use, No Respiratory Distress


Cardiovascular:  Regular Rate, Rhythm, No Edema, No Gallop, No JVD, No Murmur, 

Normal Peripheral Pulses


Capillary Refill:  Less Than 3 Seconds


Gastrointestinal:  normal bowel sounds, non tender, soft, no organomegaly; No 

distended; hernia (incarcerated right inguinal hernia, but feels like it goes 

down almost completely and then fills back up)


Extremity:  Normal Capillary Refill, Normal Inspection, Normal Range of Motion, 

Non Tender, No Calf Tenderness, No Pedal Edema


Neurologic/Psychiatric:  Alert, Oriented x3, No Motor/Sensory Deficits, Normal 

Mood/Affect


Skin:  Normal Color, Warm/Dry


Lymphatic:  No Adenopathy





Results


Lab


Laboratory Tests


7/28/23 13:03








7/29/23 04:35











Assessment/Plan


Assessment/Plan


Possible small bowel obstruction, suma consultant has seen pt, to have EGD 

today


continue IV PPI or H2 blocker but not both


follow Hb


Critical Care:  Critically Ill Patient


Time spent with patient (mins):  20











EL WOODS MD             Jul 29, 2023 07:54

## 2023-07-29 NOTE — PROGRESS NOTE-POST OPERATIVE
Post-Operative Progess Note


Surgeon (s)/Assistant (s)


Surgeon


BLAINE MENDOZA DO


Assistant:  none





Pre-Operative Diagnosis


Coffee Ground Emesis





Post-Operative Diagnosis





Duodenal ulcers


Hiatal Hernia


??Dilated esophagus vs stomach replacing esophagus





Procedure & Operative Findings


Date of Procedure


7/29/23


Procedure Performed/Findings


EGD with bx





PROCEDURE NOTE:


After informed consent was obtained, the patient was brought to the endoscopy


suite, placed in bed in left lateral decubitus position.  He was administered


IV sedation by the CRNA who then monitored  vitals the entire time, heart


rate, blood pressure and pulse ox and the scope was inserted down the mouth


through the esophagus into the stomach.  On the way down, noted some moderate


esophagitis; plus this area was very dilated.  In fact, I suctioned out between 

150 


and 200ml of saliva.  I took a picture, pushed into the stomach, pushed past the

antrum


into the duodenum.  Duodenum appeared inflamed with multiple areas of 

ulceration.


I elected to do a biopsy of one of the ulcers.  the stomach itself actually 

looked 


good with no real inflamation.  Therefore, I did not do a biopsy of the antrum 

or the


body of the stomach.  I retroflexed the scope, saw  what appeared to be a hiatal




hernia and took a picture of this.  Next, pulled the scope into the GE junction,

took 


another picture of this area; it was very inflamed and then did a biopsy.  

Pushed 


the scope back into the stomach, suctioned all the air out of the stomach. At 

this 


point pulled the scope up the esophagus and out the mouth. 





The wife reminded me that supposedly he had a portion of his esophagus removed


during a hiatal hernia repair.  That would make more sense about why this area 

was


so dilated. I will try to find old operative report.  He probably needs a 

swallow study


and may need some type of motility study.





The patient tolerated the procedure, and he recovered in endoscopy suite.


Anesthesia Type


IV sedation by CRNA





Estimated Blood Loss


Estimated blood loss (mL):  scant





Specimens/Packing


Specimens Removed


duodenal ulcer


??GE jxn vs. stomach above diaphragm











BLAINE MENDOZA DO               Jul 29, 2023 13:26

## 2023-07-29 NOTE — DISCHARGE SUMMARY
Discharge Summary


Hospital Course


Was the Problem List Reviewed?:  Yes


Problems/Dx:  


(1) Gastritis


Hospital Course


Date of Admission: Jul 28, 2023 at 16:46 


Admission Diagnosis :  





[facility] Phys[facility]  





Date of Discharge: 7/29/23 


Discharge Diagnosis: [ ]








Hospital Course:


Short course after he was admitted for concern of coffee-ground emesis. Dr Zeng consulted and performed EGD revealing gastritis. PPI and Carafate were 

initiated and noted 6 ounces of saliva in the stomach retained and he will need 

further w/u to evaluate any additional conditions














Labs and Pending Lab Test:


Laboratory Tests


7/28/23 15:07: 


Urine Color YELLOW, Urine Clarity SL CLOUDY, Urine pH 6.0, Urine Specific 

Gravity 1.025H, Urine Protein 2+H, Urine Glucose (UA) NEGATIVE, Urine Ketones 

NEGATIVE, Urine Nitrite NEGATIVE, Urine Bilirubin NEGATIVE, Urine Urobilinogen 

0.2, Urine Leukocyte Esterase NEGATIVE, Urine RBC (Auto) 1+H, Urine RBC 0-2, 

Urine WBC NONE, Urine Squamous Epithelial Cells RARE, Urine Crystals NONE, Urine

Bacteria NEGATIVE, Urine Casts PRESENT, Urine Hyaline Casts 2-5H, Urine Mucus 

NEGATIVE, Urine Culture Indicated NO


7/29/23 04:35: 


White Blood Count 9.1, Red Blood Count 3.99L, Hemoglobin 12.9L, Hematocrit 38L, 

Mean Corpuscular Volume 95, Mean Corpuscular Hemoglobin 32, Mean Corpuscular 

Hemoglobin Concent 34, Red Cell Distribution Width 12.8, Platelet Count 115L, Me

an Platelet Volume 11.8, Immature Granulocyte % (Auto) 0, Neutrophils (%) (Auto)

75, Lymphocytes (%) (Auto) 8L, Monocytes (%) (Auto) 13H, Eosinophils (%) (Auto) 

2, Basophils (%) (Auto) 1, Neutrophils # (Auto) 6.8, Lymphocytes # (Auto) 0.7L, 

Monocytes # (Auto) 1.2H, Eosinophils # (Auto) 0.2, Basophils # (Auto) 0.1, 

Immature Granulocyte # (Auto) 0.0, Percent Immature Platelet Fraction 5.7, 

Sodium Level 138, Potassium Level 4.0, Chloride Level 112H, Carbon Dioxide Level

19L, Anion Gap 7, Blood Urea Nitrogen 34H, Creatinine 0.86, Estimat Glomerular 

Filtration Rate 83, BUN/Creatinine Ratio 40, Glucose Level 91, Calcium Level 

8.4L, Corrected Calcium 9.1, Phosphorus Level 2.0L, Magnesium Level 1.7, Total 

Bilirubin 1.0, Aspartate Amino Transf (AST/SGOT) 21, Alanine Aminotransferase 

(ALT/SGPT) 13, Alkaline Phosphatase 51, Total Protein 5.5L, Albumin 3.1L


7/29/23 04:40: 


Prothrombin Time 14.0, INR Comment 1.1, Activated Partial Thromboplast Time 31





Home Meds


Active


Carafate (Sucralfate) 1 Gram Tablet 1 Gm PO ACHS


Protonix (Pantoprazole Sodium) 40 Mg Tablet.dr 40 Mg PO BID


Furosemide 20 Mg Tablet 20 Mg PO DAILY


Reported


Xarelto (Rivaroxaban) 15 Mg Tablet 15 Mg PO DAILY


Tylenol Extra Strength (Acetaminophen) 500 Mg Tablet 1,000 Mg PO HS


Aspirin EC (Aspirin) 81 Mg Tablet.dr 81 Mg PO DAILY


Acid Reducer (FAMOTIDINE) (Famotidine) 20 Mg Tablet 20 Mg PO DAILY


Losartan Potassium 100 Mg Tablet 100 Mg PO 1800 BEFORE DINNER


Metoprolol Succinate 100 Mg Tab.er.24h 100 Mg PO DAILY


Lovastatin 20 Mg Tablet 20 Mg PO HS


Assessment/Pt Instructions


PCP 1 week


Discharge Planning:  <30 minutes discharge planning





Discharge Physical Examination


Vital Signs





Vital Signs








  Date Time  Temp Pulse Resp B/P (MAP) Pulse Ox O2 Delivery O2 Flow Rate FiO2


 


7/29/23 12:30  87      


 


7/29/23 12:00     95 Room Air  


 


7/29/23 11:36 36.4       


 


7/29/23 11:00   10 140/70 (99)    


 


7/28/23 21:58       2.00 


 


7/28/23 17:15        21








General Appearance:  Chronically ill


Respiratory:  Lungs Clear


Cardiovascular:  Regular Rate, Rhythm


Allergies:  


Coded Allergies:  


     Penicillins (Unverified  Allergy, Mild, 4/12/09)





Discharge Summary


Date of Admission


Jul 28, 2023 at 16:46


Date of Discharge





Discharge Date:  Jul 29, 2023


Admission Diagnosis


Assessment:


Hematemesis


Advanced age


Pacemaker


Coronary stent on aspirin


Oral anticoagulation








Plan:


ICU


Observation


EGD tomorrow


Appreciate Dr. Zeng





Clinical Quality Measures


DVT/VTE Risk/Contraindication:


Contraindications-Pharm:  Other *list below*


Other:  


MONIQUE ARMACHANDRAN DO                Jul 29, 2023 13:15

## 2023-07-29 NOTE — ANESTHESIA-GENERAL POST-OP
MAC


Patient Condition


Mental Status/LOC:  Same as Preop


Cardiovascular:  Satisfactory


Nausea/Vomiting:  Absent


Respiratory:  Satisfactory


Pain:  Controlled


Complications:  Absent





Post Op Complications


Complications


None





Follow Up Care/Instructions


Patient Instructions


None needed.





Anesthesiology Discharge Order


Discharge Order


Patient is doing well, no complaints, stable vital signs, no apparent adverse 

anesthesia problems.   


No complications reported per nursing.











CHRISTY MOSER CRNA          Jul 29, 2023 12:49